# Patient Record
Sex: FEMALE | Race: OTHER | Employment: OTHER | URBAN - METROPOLITAN AREA
[De-identification: names, ages, dates, MRNs, and addresses within clinical notes are randomized per-mention and may not be internally consistent; named-entity substitution may affect disease eponyms.]

---

## 2018-07-25 ENCOUNTER — APPOINTMENT (OUTPATIENT)
Dept: CT IMAGING | Facility: HOSPITAL | Age: 73
DRG: 246 | End: 2018-07-25
Attending: EMERGENCY MEDICINE
Payer: MEDICARE

## 2018-07-25 ENCOUNTER — HOSPITAL ENCOUNTER (INPATIENT)
Facility: HOSPITAL | Age: 73
LOS: 5 days | Discharge: HOME | DRG: 246 | End: 2018-07-31
Attending: EMERGENCY MEDICINE | Admitting: HOSPITALIST
Payer: MEDICARE

## 2018-07-25 ENCOUNTER — APPOINTMENT (OUTPATIENT)
Dept: GENERAL RADIOLOGY | Facility: HOSPITAL | Age: 73
DRG: 246 | End: 2018-07-25
Attending: EMERGENCY MEDICINE
Payer: MEDICARE

## 2018-07-25 DIAGNOSIS — J81.0 ACUTE PULMONARY EDEMA (HCC): Primary | ICD-10-CM

## 2018-07-25 DIAGNOSIS — I16.1 HYPERTENSIVE EMERGENCY: ICD-10-CM

## 2018-07-25 DIAGNOSIS — J96.01 ACUTE RESPIRATORY FAILURE WITH HYPOXIA (HCC): ICD-10-CM

## 2018-07-25 LAB
ALBUMIN SERPL BCP-MCNC: 4 G/DL (ref 3.5–4.8)
ALBUMIN/GLOB SERPL: 1.1 {RATIO} (ref 1–2)
ALP SERPL-CCNC: 48 U/L (ref 32–100)
ALT SERPL-CCNC: 14 U/L (ref 14–54)
ANION GAP SERPL CALC-SCNC: 13 MMOL/L (ref 0–18)
APTT PPP: 28.1 SECONDS (ref 23.2–35.3)
AST SERPL-CCNC: 30 U/L (ref 15–41)
BASE EXCESS BLD CALC-SCNC: -5.4 MMOL/L (ref ?–2)
BASOPHILS # BLD: 0.1 K/UL (ref 0–0.2)
BASOPHILS NFR BLD: 1 %
BILIRUB SERPL-MCNC: 0.6 MG/DL (ref 0.3–1.2)
BLOOD GAS EPAP: 6 CM H2O
BLOOD GAS IPAP: 12 CM H2O
BNP SERPL-MCNC: 1571 PG/ML (ref 0–100)
BUN SERPL-MCNC: 12 MG/DL (ref 8–20)
BUN/CREAT SERPL: 12.4 (ref 10–20)
CALCIUM SERPL-MCNC: 8.6 MG/DL (ref 8.5–10.5)
CHLORIDE SERPL-SCNC: 105 MMOL/L (ref 95–110)
CHOLEST SERPL-MCNC: 247 MG/DL (ref 110–200)
CO2 SERPL-SCNC: 20 MMOL/L (ref 22–32)
CREAT SERPL-MCNC: 0.97 MG/DL (ref 0.5–1.5)
EOSINOPHIL # BLD: 0.3 K/UL (ref 0–0.7)
EOSINOPHIL NFR BLD: 3 %
ERYTHROCYTE [DISTWIDTH] IN BLOOD BY AUTOMATED COUNT: 13.4 % (ref 11–15)
GLOBULIN PLAS-MCNC: 3.7 G/DL (ref 2.5–3.7)
GLUCOSE SERPL-MCNC: 345 MG/DL (ref 70–99)
HCO3 BLDA-SCNC: 20 MEQ/L (ref 21–27)
HCT VFR BLD AUTO: 43.8 % (ref 35–48)
HDLC SERPL-MCNC: 68 MG/DL
HGB BLD-MCNC: 14.1 G/DL (ref 12–16)
INR BLD: 1.1 (ref 0.9–1.2)
LDLC SERPL CALC-MCNC: 149 MG/DL (ref 0–99)
LYMPHOCYTES # BLD: 4.9 K/UL (ref 1–4)
LYMPHOCYTES NFR BLD: 42 %
MAGNESIUM SERPL-MCNC: 2.2 MG/DL (ref 1.8–2.5)
MCH RBC QN AUTO: 30.7 PG (ref 27–32)
MCHC RBC AUTO-ENTMCNC: 32.2 G/DL (ref 32–37)
MCV RBC AUTO: 95.3 FL (ref 80–100)
MODIFIED ALLEN TEST: POSITIVE
MONOCYTES # BLD: 0.4 K/UL (ref 0–1)
MONOCYTES NFR BLD: 3 %
NEUTROPHILS # BLD AUTO: 6 K/UL (ref 1.8–7.7)
NEUTROPHILS NFR BLD: 51 %
NONHDLC SERPL-MCNC: 179 MG/DL
O2 CT BLD-SCNC: 17.2 VOL% (ref 15–23)
O2/TOTAL GAS SETTING VFR VENT: 100 %
OSMOLALITY UR CALC.SUM OF ELEC: 299 MOSM/KG (ref 275–295)
PCO2 BLDA: 41 MM HG (ref 35–45)
PH BLDA: 7.31 [PH] (ref 7.35–7.45)
PLATELET # BLD AUTO: 288 K/UL (ref 140–400)
PMV BLD AUTO: 8.7 FL (ref 7.4–10.3)
PO2 BLDA: 76 MM HG (ref 80–100)
PO2 SATN ADJ TO 0.5 BLD: 26 MMHG (ref 24–28)
POTASSIUM SERPL-SCNC: 3.5 MMOL/L (ref 3.3–5.1)
PROT SERPL-MCNC: 7.7 G/DL (ref 5.9–8.4)
PROTHROMBIN TIME: 14.2 SECONDS (ref 11.8–14.5)
PUNCTURE CHARGE: YES
RBC # BLD AUTO: 4.59 M/UL (ref 3.7–5.4)
RESP RATE: 15 BPM
SAO2 % BLDA: 95.2 % (ref 94–100)
SODIUM SERPL-SCNC: 138 MMOL/L (ref 136–144)
TRIGL SERPL-MCNC: 152 MG/DL (ref 1–149)
TROPONIN I SERPL-MCNC: 0.04 NG/ML (ref ?–0.03)
WBC # BLD AUTO: 11.7 K/UL (ref 4–11)

## 2018-07-25 PROCEDURE — 71045 X-RAY EXAM CHEST 1 VIEW: CPT | Performed by: EMERGENCY MEDICINE

## 2018-07-25 RX ORDER — FUROSEMIDE 10 MG/ML
40 INJECTION INTRAMUSCULAR; INTRAVENOUS ONCE
Status: COMPLETED | OUTPATIENT
Start: 2018-07-25 | End: 2018-07-25

## 2018-07-25 RX ORDER — NITROGLYCERIN 20 MG/100ML
50 INJECTION INTRAVENOUS CONTINUOUS
Status: DISCONTINUED | OUTPATIENT
Start: 2018-07-25 | End: 2018-07-28

## 2018-07-25 RX ORDER — NITROGLYCERIN 0.4 MG/1
0.4 TABLET SUBLINGUAL ONCE
Status: COMPLETED | OUTPATIENT
Start: 2018-07-25 | End: 2018-07-25

## 2018-07-26 ENCOUNTER — APPOINTMENT (OUTPATIENT)
Dept: CV DIAGNOSTICS | Facility: HOSPITAL | Age: 73
DRG: 246 | End: 2018-07-26
Attending: INTERNAL MEDICINE
Payer: MEDICARE

## 2018-07-26 ENCOUNTER — APPOINTMENT (OUTPATIENT)
Dept: GENERAL RADIOLOGY | Facility: HOSPITAL | Age: 73
DRG: 246 | End: 2018-07-26
Attending: INTERNAL MEDICINE
Payer: MEDICARE

## 2018-07-26 PROBLEM — J96.01 ACUTE RESPIRATORY FAILURE WITH HYPOXIA (HCC): Status: ACTIVE | Noted: 2018-07-26

## 2018-07-26 PROBLEM — I16.1 HYPERTENSIVE EMERGENCY: Status: ACTIVE | Noted: 2018-07-26

## 2018-07-26 PROBLEM — I50.9 CHF (CONGESTIVE HEART FAILURE) (HCC): Status: ACTIVE | Noted: 2018-07-26

## 2018-07-26 LAB
ANION GAP SERPL CALC-SCNC: 8 MMOL/L (ref 0–18)
APTT PPP: 27.7 SECONDS (ref 23.2–35.3)
APTT PPP: 57 SECONDS (ref 23.2–35.3)
BASOPHILS # BLD: 0 K/UL (ref 0–0.2)
BASOPHILS NFR BLD: 0 %
BUN SERPL-MCNC: 10 MG/DL (ref 8–20)
BUN/CREAT SERPL: 11.9 (ref 10–20)
CALCIUM SERPL-MCNC: 8.5 MG/DL (ref 8.5–10.5)
CHLORIDE SERPL-SCNC: 105 MMOL/L (ref 95–110)
CO2 SERPL-SCNC: 26 MMOL/L (ref 22–32)
CREAT SERPL-MCNC: 0.84 MG/DL (ref 0.5–1.5)
EOSINOPHIL # BLD: 0 K/UL (ref 0–0.7)
EOSINOPHIL NFR BLD: 0 %
ERYTHROCYTE [DISTWIDTH] IN BLOOD BY AUTOMATED COUNT: 13.2 % (ref 11–15)
GLUCOSE SERPL-MCNC: 141 MG/DL (ref 70–99)
HCT VFR BLD AUTO: 37.7 % (ref 35–48)
HGB BLD-MCNC: 12.5 G/DL (ref 12–16)
LYMPHOCYTES # BLD: 1.1 K/UL (ref 1–4)
LYMPHOCYTES NFR BLD: 11 %
MCH RBC QN AUTO: 30.9 PG (ref 27–32)
MCHC RBC AUTO-ENTMCNC: 33.2 G/DL (ref 32–37)
MCV RBC AUTO: 93.2 FL (ref 80–100)
MONOCYTES # BLD: 0.8 K/UL (ref 0–1)
MONOCYTES NFR BLD: 7 %
MRSA DNA SPEC QL NAA+PROBE: NEGATIVE
NEUTROPHILS # BLD AUTO: 8.5 K/UL (ref 1.8–7.7)
NEUTROPHILS NFR BLD: 82 %
OSMOLALITY UR CALC.SUM OF ELEC: 289 MOSM/KG (ref 275–295)
PLATELET # BLD AUTO: 240 K/UL (ref 140–400)
PMV BLD AUTO: 8.2 FL (ref 7.4–10.3)
POTASSIUM SERPL-SCNC: 4.2 MMOL/L (ref 3.3–5.1)
RBC # BLD AUTO: 4.05 M/UL (ref 3.7–5.4)
SODIUM SERPL-SCNC: 139 MMOL/L (ref 136–144)
TROPONIN I SERPL-MCNC: 0.68 NG/ML (ref ?–0.03)
TROPONIN I SERPL-MCNC: 0.89 NG/ML (ref ?–0.03)
WBC # BLD AUTO: 10.5 K/UL (ref 4–11)

## 2018-07-26 PROCEDURE — 71045 X-RAY EXAM CHEST 1 VIEW: CPT | Performed by: INTERNAL MEDICINE

## 2018-07-26 PROCEDURE — 93306 TTE W/DOPPLER COMPLETE: CPT | Performed by: INTERNAL MEDICINE

## 2018-07-26 PROCEDURE — 99223 1ST HOSP IP/OBS HIGH 75: CPT | Performed by: INTERNAL MEDICINE

## 2018-07-26 RX ORDER — ASPIRIN 81 MG/1
324 TABLET, CHEWABLE ORAL ONCE
Status: COMPLETED | OUTPATIENT
Start: 2018-07-26 | End: 2018-07-27

## 2018-07-26 RX ORDER — 0.9 % SODIUM CHLORIDE 0.9 %
VIAL (ML) INJECTION
Status: COMPLETED
Start: 2018-07-26 | End: 2018-07-26

## 2018-07-26 RX ORDER — HEPARIN SODIUM 1000 [USP'U]/ML
60 INJECTION, SOLUTION INTRAVENOUS; SUBCUTANEOUS ONCE
Status: COMPLETED | OUTPATIENT
Start: 2018-07-26 | End: 2018-07-26

## 2018-07-26 RX ORDER — HEPARIN SODIUM 5000 [USP'U]/ML
5000 INJECTION, SOLUTION INTRAVENOUS; SUBCUTANEOUS EVERY 12 HOURS
Status: DISCONTINUED | OUTPATIENT
Start: 2018-07-26 | End: 2018-07-26

## 2018-07-26 RX ORDER — SODIUM CHLORIDE 9 MG/ML
INJECTION, SOLUTION INTRAVENOUS CONTINUOUS
Status: DISCONTINUED | OUTPATIENT
Start: 2018-07-26 | End: 2018-07-28

## 2018-07-26 RX ORDER — HEPARIN SODIUM AND DEXTROSE 10000; 5 [USP'U]/100ML; G/100ML
INJECTION INTRAVENOUS CONTINUOUS
Status: DISCONTINUED | OUTPATIENT
Start: 2018-07-26 | End: 2018-07-27

## 2018-07-26 RX ORDER — HEPARIN SODIUM AND DEXTROSE 10000; 5 [USP'U]/100ML; G/100ML
12 INJECTION INTRAVENOUS ONCE
Status: COMPLETED | OUTPATIENT
Start: 2018-07-26 | End: 2018-07-26

## 2018-07-26 RX ORDER — CHLORHEXIDINE GLUCONATE 4 G/100ML
30 SOLUTION TOPICAL
Status: COMPLETED | OUTPATIENT
Start: 2018-07-27 | End: 2018-07-27

## 2018-07-26 RX ORDER — FUROSEMIDE 10 MG/ML
40 INJECTION INTRAMUSCULAR; INTRAVENOUS
Status: DISCONTINUED | OUTPATIENT
Start: 2018-07-26 | End: 2018-07-30

## 2018-07-26 RX ORDER — ASPIRIN 325 MG
325 TABLET ORAL DAILY
Status: DISCONTINUED | OUTPATIENT
Start: 2018-07-26 | End: 2018-07-27

## 2018-07-26 RX ORDER — ASPIRIN 81 MG/1
81 TABLET ORAL DAILY
Status: DISCONTINUED | OUTPATIENT
Start: 2018-07-26 | End: 2018-07-26

## 2018-07-26 NOTE — CONSULTS
[facility]    Report of Consultation    Floridalma Buckner Patient Status:  Inpatient    1945 MRN [id number]   Location [facility] 2W/ Attending Neli Rouse MD   Hosp Day # 0 PCP None Pcp     Date of Admission:  2018 BiPAP.  She never had associated chest pain. EKG revealed sinus with left bundle branch block. Troponin was initially borderline at 0.04 and the BNP was 1571. Initial blood pressure was 180/110.   Echo which I reviewed reveals moderate MR LV enlargement Normocephalic, anicteric sclera, neck supple, no thyromegaly or adenopathy. Neck:  No JVD, carotids 2+, no bruits. Cardiac:  Regular rate and rhythm. S1, S2 normal. No murmur,  rub, or extra cardiac sounds.   Lungs:  Clear without wheezes, rales, rhonchi superimposed pneumonia. 3. No pneumothorax      Dictated by (CST): Lanie Radford MD on 7/25/2018 at 22:12     Approved by (CST): Lanie Radford MD on 7/25/2018 at 22:17            Thank you for allowing me to participate in the care of your patient.

## 2018-07-26 NOTE — CONSULTS
[facility]OSP - [facility]    Consult Note    Date:  7/26/2018  Date of Admission:  7/25/2018      Chief Complaint:   Jw Dowling is a(n) 68year old female with dyspnea. HPI:   Patient is a history of untreated hypertension.   She does not follow with %.     Alert white female on BiPAP  HEENT examination is unremarkable with pupils equal round and reactive to light and accommodation. Neck without adenopathy, thyromegaly, JVD nor bruit. Lungs diminished with basilar crackles to auscultation.   Cardiac [facility]  Medical Director, [address] [facility]. [phone]  Pager: [phone]

## 2018-07-26 NOTE — PLAN OF CARE
Problem: Patient Centered Care  Goal: Patient preferences are identified and integrated in the patient's plan of care  Interventions:  - What would you like us to know as we care for you? I am nervous about my health. I feel better when my family is here. injury  INTERVENTIONS:  - Assess pt frequently for physical needs  - Identify cognitive and physical deficits and behaviors that affect risk of falls.   - [place] fall precautions as indicated by assessment.  - Educate pt/family on patient safety includin strategies  - Use visual cues when possible  - Listen attentively, be patient, do not interrupt  - Minimize distractions  - Allow time for understanding and response  - Establish method for patient to ask for assistance (call light)  - Provide an interpret respiratory status  - Assess for changes in mentation and behavior  - Position to facilitate oxygenation and minimize respiratory effort  - Oxygen supplementation based on oxygen saturation or ABGs  - Provide Smoking Cessation handout, if applicable  - Enc independent at home. To encourage patient to remain as much independence as her health status allows, set up bedpan at bedside to allow patient independence in this activity after assisting with lines/equipment.  Allowed patient to stand up and pivot to [employer]

## 2018-07-26 NOTE — ED PROVIDER NOTES
Patient Seen in: [facility] Emergency Department    History   Patient presents with:  Shortness Of Breath    Stated Complaint:     HPI    50-year-old female presents for complaint of shortness of breath.   Patient is unable to provide any history s Neck: Normal range of motion. Neck supple. Cardiovascular: Regular rhythm, S1 normal, S2 normal, normal heart sounds and intact distal pulses. Tachycardia present. Pulses:       Radial pulses are 2+ on the right side, and 2+ on the left side. Normal   PTT, ACTIVATED - Normal   CBC WITH DIFFERENTIAL WITH PLATELET    Narrative: The following orders were created for panel order CBC WITH DIFFERENTIAL WITH PLATELET.   Procedure                               Abnormality         Status Atherosclerotic aorta with unusual density in the projection of the aortic knob LUNGS/PLEURA: Poor inspiratory effort. Bilateral effusions with adjacent lower lung field compressive atelectasis.  Areas of superimposed atelectasis or partial consolidation ma screening including reassessment of your blood pressure. Medications Prescribed:  There are no discharge medications for this patient.       Present on Admission           ICD-10-CM Noted POA    Acute pulmonary edema ([facility] [address] J81.0 7/25/2018 Unknown

## 2018-07-27 ENCOUNTER — APPOINTMENT (OUTPATIENT)
Dept: INTERVENTIONAL RADIOLOGY/VASCULAR | Facility: HOSPITAL | Age: 73
DRG: 246 | End: 2018-07-27
Attending: INTERNAL MEDICINE
Payer: MEDICARE

## 2018-07-27 ENCOUNTER — APPOINTMENT (OUTPATIENT)
Dept: GENERAL RADIOLOGY | Facility: HOSPITAL | Age: 73
DRG: 246 | End: 2018-07-27
Attending: INTERNAL MEDICINE
Payer: MEDICARE

## 2018-07-27 ENCOUNTER — PRIOR ORIGINAL RECORDS (OUTPATIENT)
Dept: OTHER | Age: 73
End: 2018-07-27

## 2018-07-27 LAB
ANION GAP SERPL CALC-SCNC: 7 MMOL/L (ref 0–18)
APTT PPP: 55.5 SECONDS (ref 23.2–35.3)
BASOPHILS # BLD: 0.1 K/UL (ref 0–0.2)
BASOPHILS NFR BLD: 1 %
BUN SERPL-MCNC: 13 MG/DL (ref 8–20)
BUN/CREAT SERPL: 14.6 (ref 10–20)
CALCIUM SERPL-MCNC: 8.3 MG/DL (ref 8.5–10.5)
CHLORIDE SERPL-SCNC: 103 MMOL/L (ref 95–110)
CHOLEST SERPL-MCNC: 187 MG/DL (ref 110–200)
CO2 SERPL-SCNC: 27 MMOL/L (ref 22–32)
CREAT SERPL-MCNC: 0.89 MG/DL (ref 0.5–1.5)
EOSINOPHIL # BLD: 0.2 K/UL (ref 0–0.7)
EOSINOPHIL NFR BLD: 3 %
ERYTHROCYTE [DISTWIDTH] IN BLOOD BY AUTOMATED COUNT: 13.3 % (ref 11–15)
GLUCOSE SERPL-MCNC: 147 MG/DL (ref 70–99)
HCT VFR BLD AUTO: 32.5 % (ref 35–48)
HDLC SERPL-MCNC: 57 MG/DL
HGB BLD-MCNC: 11.1 G/DL (ref 12–16)
LDLC SERPL CALC-MCNC: 111 MG/DL (ref 0–99)
LYMPHOCYTES # BLD: 1.9 K/UL (ref 1–4)
LYMPHOCYTES NFR BLD: 24 %
MAGNESIUM SERPL-MCNC: 1.8 MG/DL (ref 1.8–2.5)
MCH RBC QN AUTO: 31.2 PG (ref 27–32)
MCHC RBC AUTO-ENTMCNC: 34.3 G/DL (ref 32–37)
MCV RBC AUTO: 91.2 FL (ref 80–100)
MONOCYTES # BLD: 0.8 K/UL (ref 0–1)
MONOCYTES NFR BLD: 11 %
NEUTROPHILS # BLD AUTO: 4.8 K/UL (ref 1.8–7.7)
NEUTROPHILS NFR BLD: 62 %
NONHDLC SERPL-MCNC: 130 MG/DL
OSMOLALITY UR CALC.SUM OF ELEC: 287 MOSM/KG (ref 275–295)
PLATELET # BLD AUTO: 239 K/UL (ref 140–400)
PMV BLD AUTO: 8.2 FL (ref 7.4–10.3)
POTASSIUM SERPL-SCNC: 3.1 MMOL/L (ref 3.3–5.1)
RBC # BLD AUTO: 3.57 M/UL (ref 3.7–5.4)
SODIUM SERPL-SCNC: 137 MMOL/L (ref 136–144)
TRIGL SERPL-MCNC: 93 MG/DL (ref 1–149)
WBC # BLD AUTO: 7.8 K/UL (ref 4–11)

## 2018-07-27 PROCEDURE — B2151ZZ FLUOROSCOPY OF LEFT HEART USING LOW OSMOLAR CONTRAST: ICD-10-PCS | Performed by: INTERNAL MEDICINE

## 2018-07-27 PROCEDURE — 71045 X-RAY EXAM CHEST 1 VIEW: CPT | Performed by: INTERNAL MEDICINE

## 2018-07-27 PROCEDURE — 99233 SBSQ HOSP IP/OBS HIGH 50: CPT | Performed by: INTERNAL MEDICINE

## 2018-07-27 PROCEDURE — 027034Z DILATION OF CORONARY ARTERY, ONE ARTERY WITH DRUG-ELUTING INTRALUMINAL DEVICE, PERCUTANEOUS APPROACH: ICD-10-PCS | Performed by: INTERNAL MEDICINE

## 2018-07-27 PROCEDURE — 4A023N8 MEASUREMENT OF CARDIAC SAMPLING AND PRESSURE, BILATERAL, PERCUTANEOUS APPROACH: ICD-10-PCS | Performed by: INTERNAL MEDICINE

## 2018-07-27 PROCEDURE — B2111ZZ FLUOROSCOPY OF MULTIPLE CORONARY ARTERIES USING LOW OSMOLAR CONTRAST: ICD-10-PCS | Performed by: INTERNAL MEDICINE

## 2018-07-27 RX ORDER — CLOPIDOGREL BISULFATE 75 MG/1
75 TABLET ORAL DAILY
Status: DISCONTINUED | OUTPATIENT
Start: 2018-07-28 | End: 2018-07-31

## 2018-07-27 RX ORDER — CLOPIDOGREL BISULFATE 75 MG/1
TABLET ORAL
Status: COMPLETED
Start: 2018-07-27 | End: 2018-07-27

## 2018-07-27 RX ORDER — HEPARIN SODIUM 1000 [USP'U]/ML
INJECTION, SOLUTION INTRAVENOUS; SUBCUTANEOUS
Status: COMPLETED
Start: 2018-07-27 | End: 2018-07-27

## 2018-07-27 RX ORDER — POTASSIUM CHLORIDE 20 MEQ/1
40 TABLET, EXTENDED RELEASE ORAL DAILY
Status: DISCONTINUED | OUTPATIENT
Start: 2018-07-27 | End: 2018-07-31

## 2018-07-27 RX ORDER — LISINOPRIL 5 MG/1
5 TABLET ORAL DAILY
Status: DISCONTINUED | OUTPATIENT
Start: 2018-07-27 | End: 2018-07-31

## 2018-07-27 RX ORDER — MIDAZOLAM HYDROCHLORIDE 1 MG/ML
INJECTION INTRAMUSCULAR; INTRAVENOUS
Status: COMPLETED
Start: 2018-07-27 | End: 2018-07-27

## 2018-07-27 RX ORDER — LIDOCAINE HYDROCHLORIDE 20 MG/ML
INJECTION, SOLUTION EPIDURAL; INFILTRATION; INTRACAUDAL; PERINEURAL
Status: COMPLETED
Start: 2018-07-27 | End: 2018-07-27

## 2018-07-27 RX ORDER — ATORVASTATIN CALCIUM 20 MG/1
20 TABLET, FILM COATED ORAL NIGHTLY
Status: DISCONTINUED | OUTPATIENT
Start: 2018-07-27 | End: 2018-07-31

## 2018-07-27 RX ORDER — CARVEDILOL 3.12 MG/1
3.12 TABLET ORAL 2 TIMES DAILY WITH MEALS
Status: DISCONTINUED | OUTPATIENT
Start: 2018-07-27 | End: 2018-07-31

## 2018-07-27 RX ORDER — ASPIRIN 81 MG/1
81 TABLET ORAL DAILY
Status: DISCONTINUED | OUTPATIENT
Start: 2018-07-28 | End: 2018-07-31

## 2018-07-27 RX ORDER — NITROGLYCERIN 20 MG/100ML
INJECTION INTRAVENOUS
Status: COMPLETED
Start: 2018-07-27 | End: 2018-07-27

## 2018-07-27 NOTE — PROGRESS NOTES
Pulmonary/Critical Care Follow Up Note    HPI:   Emir Gaspar is a 68year old female with Patient presents with:  Shortness Of Breath      PCP None Pcp  Admission Attending Karma Owusu 15 Day #1    No CP  Less sob  No fever  Has appetite emergecny  Better  Plan ntg gtt   NIV as needed   Lasix    NSTEMI  S/p PCI with intervention  No CP  Plan ASA/Plavix   Cardiac rehab?     DVT px  Plan IV heparin          Caryle Parsons, MD

## 2018-07-27 NOTE — PLAN OF CARE
Achieves optimal ventilation and oxygenation Not Progressing    Carmel has alternated between Bipap and high flow nc 15L today. She does desat to the 88-90% range after being on the nc for a while and then is switched back to bipap.  She continues to have c

## 2018-07-27 NOTE — H&P
Cardiology. Will start weaning ntg and add lisinopril coreg and atorvastatin. Reassess lasix  In am and consider aldactone.   Ana Self MD [facility]

## 2018-07-27 NOTE — PROGRESS NOTES
Core Measure Update: EF 35-40. Currentlynot on ACE, ARB, Spironolactone , long acting BB secondary to hypertensive urgency on Nitro. Consider in the future if clinically appropriate.

## 2018-07-27 NOTE — DIETARY NOTE
NUTRITION:  Diet Education    Consult received for diet education per protocol. Education deferred until s/p intervention and when appropriate for teaching.     [address], [address] [facility]  Ext [phone]

## 2018-07-28 LAB
ANION GAP SERPL CALC-SCNC: 7 MMOL/L (ref 0–18)
BUN SERPL-MCNC: 14 MG/DL (ref 8–20)
BUN/CREAT SERPL: 17.3 (ref 10–20)
CALCIUM SERPL-MCNC: 8.4 MG/DL (ref 8.5–10.5)
CHLORIDE SERPL-SCNC: 102 MMOL/L (ref 95–110)
CO2 SERPL-SCNC: 28 MMOL/L (ref 22–32)
CREAT SERPL-MCNC: 0.81 MG/DL (ref 0.5–1.5)
ERYTHROCYTE [DISTWIDTH] IN BLOOD BY AUTOMATED COUNT: 13.2 % (ref 11–15)
GLUCOSE SERPL-MCNC: 123 MG/DL (ref 70–99)
HCT VFR BLD AUTO: 34.2 % (ref 35–48)
HGB BLD-MCNC: 11.5 G/DL (ref 12–16)
MCH RBC QN AUTO: 30.8 PG (ref 27–32)
MCHC RBC AUTO-ENTMCNC: 33.6 G/DL (ref 32–37)
MCV RBC AUTO: 91.6 FL (ref 80–100)
OSMOLALITY UR CALC.SUM OF ELEC: 286 MOSM/KG (ref 275–295)
PLATELET # BLD AUTO: 224 K/UL (ref 140–400)
PMV BLD AUTO: 8.7 FL (ref 7.4–10.3)
POTASSIUM SERPL-SCNC: 3.6 MMOL/L (ref 3.3–5.1)
RBC # BLD AUTO: 3.73 M/UL (ref 3.7–5.4)
SODIUM SERPL-SCNC: 137 MMOL/L (ref 136–144)
WBC # BLD AUTO: 7.9 K/UL (ref 4–11)

## 2018-07-28 PROCEDURE — 99233 SBSQ HOSP IP/OBS HIGH 50: CPT | Performed by: HOSPITALIST

## 2018-07-28 PROCEDURE — 99232 SBSQ HOSP IP/OBS MODERATE 35: CPT | Performed by: INTERNAL MEDICINE

## 2018-07-28 RX ORDER — POTASSIUM CHLORIDE 20 MEQ/1
40 TABLET, EXTENDED RELEASE ORAL EVERY 4 HOURS
Status: COMPLETED | OUTPATIENT
Start: 2018-07-28 | End: 2018-07-28

## 2018-07-28 NOTE — PROGRESS NOTES
[facility]    Progress Note    Ciara Barnes Patient Status:  Inpatient    1945 MRN [id number]   Location [facility] 3W/SW Attending Dwaine Washington MD   Hosp Day # 2 PCP None Pcp     Subjective:     Constitutional: Waqar Daniel care    Needs new primary will refer to [address]  speaking primary at  as she came in on dr boyer's er call          Results:     Lab Results  Component Value Date   WBC 7.9 07/28/2018   HGB 11.5 (L) 07/28/2018   HCT 34.2 (L) 07/28/2018    07/28/

## 2018-07-28 NOTE — PROGRESS NOTES
[facility]OSP - [facility]    Cardiology Progress Note    Donnis Ground Patient Status:  Inpatient    1945 MRN [id number]   Location [facility] 2W/SW Attending Catrina Wiggins MD   Hosp Day # 2 PCP None Pcp     2018  Subjective: no ev Lab  07/25/18 2122   ALT  14   AST  30   ALB  4.0       Recent Labs   Lab  07/25/18 2122 07/26/18   0922  07/26/18   1627   TROP  0.04*  0.89*  0.68*       Allergies:   No Known Allergies    Medications:    Current Facility-Administered Medications:

## 2018-07-28 NOTE — PROGRESS NOTES
[facility]     Progress Note        Marie Gooden Patient Status:  Inpatient    1945 MRN [id number]   Location [facility] 3W/SW Attending Kisha Singer MD   Hosp Day # 2 PCP None Pcp       Subjective:   Patient seen and e Ap Portable  (cpt=71045)    Result Date: 7/27/2018  CONCLUSION:  1. Persistent but decreased bilateral airspace disease right more than left which may be related to underlying pulmonary edema which has improved slightly since previous study.  Can't exclude

## 2018-07-29 ENCOUNTER — APPOINTMENT (OUTPATIENT)
Dept: GENERAL RADIOLOGY | Facility: HOSPITAL | Age: 73
DRG: 246 | End: 2018-07-29
Attending: INTERNAL MEDICINE
Payer: MEDICARE

## 2018-07-29 LAB — POTASSIUM SERPL-SCNC: 4.1 MMOL/L (ref 3.3–5.1)

## 2018-07-29 PROCEDURE — 71045 X-RAY EXAM CHEST 1 VIEW: CPT | Performed by: INTERNAL MEDICINE

## 2018-07-29 PROCEDURE — 99233 SBSQ HOSP IP/OBS HIGH 50: CPT | Performed by: HOSPITALIST

## 2018-07-29 PROCEDURE — 99232 SBSQ HOSP IP/OBS MODERATE 35: CPT | Performed by: INTERNAL MEDICINE

## 2018-07-29 RX ORDER — FUROSEMIDE 10 MG/ML
40 INJECTION INTRAMUSCULAR; INTRAVENOUS ONCE
Status: COMPLETED | OUTPATIENT
Start: 2018-07-29 | End: 2018-07-29

## 2018-07-29 RX ORDER — 0.9 % SODIUM CHLORIDE 0.9 %
VIAL (ML) INJECTION
Status: COMPLETED
Start: 2018-07-29 | End: 2018-07-29

## 2018-07-29 NOTE — PLAN OF CARE
Problem: Patient/Family Goals  Goal: Patient/Family Long Term Goal  Patient's Long Term Goal: To get better and go home    Interventions:  - Participates in care planning, education, and incorporates family into education.  - See additional Care Plan goals Assess for changes in mentation and behavior  - Position to facilitate oxygenation and minimize respiratory effort  - Oxygen supplementation based on oxygen saturation or ABGs  - Provide Smoking Cessation handout, if applicable  - Encourage broncho-pulmona

## 2018-07-29 NOTE — PROGRESS NOTES
[facility]     Progress Note        Salome Garcia Patient Status:  Inpatient    1945 MRN [id number]   Location [facility] 3W/SW Attending Robbi Balderrama MD   Hosp Day # 3 PCP None Pcp       Subjective:   Patient seen and e 7/27/2018  CONCLUSION:  1. Persistent but decreased bilateral airspace disease right more than left which may be related to underlying pulmonary edema which has improved slightly since previous study. Can't exclude underlying pneumonia.  Small right pleural

## 2018-07-29 NOTE — PROGRESS NOTES
[facility] - [facility]    Progress Note    Iqbal  Patient Status:  Inpatient    1945 MRN [id number]   Location [facility] 3W/SW Attending Keenan Powell Day # 3 PCP None Pcp     Subjective:     HENT: Positive f family translating  On [address] about MI care     Needs new primary will refer to [address] speaking primary at  as she came in on dr boyer's er call          Results:     Lab Results  Component Value Date   WBC 7.9 07/28/2018   HGB 11.5 (L) 07/28

## 2018-07-30 ENCOUNTER — APPOINTMENT (OUTPATIENT)
Dept: CV DIAGNOSTICS | Facility: HOSPITAL | Age: 73
DRG: 246 | End: 2018-07-30
Attending: INTERNAL MEDICINE
Payer: MEDICARE

## 2018-07-30 ENCOUNTER — PRIOR ORIGINAL RECORDS (OUTPATIENT)
Dept: OTHER | Age: 73
End: 2018-07-30

## 2018-07-30 LAB
ANION GAP SERPL CALC-SCNC: 7 MMOL/L (ref 0–18)
BASOPHILS # BLD: 0 K/UL (ref 0–0.2)
BASOPHILS NFR BLD: 1 %
BUN SERPL-MCNC: 31 MG/DL (ref 8–20)
BUN/CREAT SERPL: 35.2 (ref 10–20)
CALCIUM SERPL-MCNC: 9.3 MG/DL (ref 8.5–10.5)
CHLORIDE SERPL-SCNC: 103 MMOL/L (ref 95–110)
CO2 SERPL-SCNC: 29 MMOL/L (ref 22–32)
CREAT SERPL-MCNC: 0.88 MG/DL (ref 0.5–1.5)
EOSINOPHIL # BLD: 0.3 K/UL (ref 0–0.7)
EOSINOPHIL NFR BLD: 4 %
ERYTHROCYTE [DISTWIDTH] IN BLOOD BY AUTOMATED COUNT: 13.2 % (ref 11–15)
GLUCOSE SERPL-MCNC: 129 MG/DL (ref 70–99)
HBA1C MFR BLD: 5.6 % (ref 4–6)
HCT VFR BLD AUTO: 38.4 % (ref 35–48)
HGB BLD-MCNC: 12.8 G/DL (ref 12–16)
LYMPHOCYTES # BLD: 1.8 K/UL (ref 1–4)
LYMPHOCYTES NFR BLD: 27 %
MAGNESIUM SERPL-MCNC: 2 MG/DL (ref 1.8–2.5)
MCH RBC QN AUTO: 30.8 PG (ref 27–32)
MCHC RBC AUTO-ENTMCNC: 33.3 G/DL (ref 32–37)
MCV RBC AUTO: 92.5 FL (ref 80–100)
MONOCYTES # BLD: 0.6 K/UL (ref 0–1)
MONOCYTES NFR BLD: 10 %
NEUTROPHILS # BLD AUTO: 3.9 K/UL (ref 1.8–7.7)
NEUTROPHILS NFR BLD: 59 %
OSMOLALITY UR CALC.SUM OF ELEC: 296 MOSM/KG (ref 275–295)
PLATELET # BLD AUTO: 271 K/UL (ref 140–400)
PMV BLD AUTO: 8.9 FL (ref 7.4–10.3)
POTASSIUM SERPL-SCNC: 4.3 MMOL/L (ref 3.3–5.1)
RBC # BLD AUTO: 4.14 M/UL (ref 3.7–5.4)
SODIUM SERPL-SCNC: 139 MMOL/L (ref 136–144)
WBC # BLD AUTO: 6.6 K/UL (ref 4–11)

## 2018-07-30 PROCEDURE — 99233 SBSQ HOSP IP/OBS HIGH 50: CPT | Performed by: HOSPITALIST

## 2018-07-30 PROCEDURE — 93306 TTE W/DOPPLER COMPLETE: CPT | Performed by: INTERNAL MEDICINE

## 2018-07-30 RX ORDER — FUROSEMIDE 20 MG/1
20 TABLET ORAL DAILY
Status: DISCONTINUED | OUTPATIENT
Start: 2018-07-31 | End: 2018-07-31

## 2018-07-30 NOTE — PROGRESS NOTES
[facility]    Progress Note    Eduardo Max Patient Status:  Inpatient    1945 MRN [id number]   Location [facility] 3W/SW Attending Keenan Powell Day # 4 PCP None Pcp     Subjective:     Constitutional: translating  On [address] about MI care     Needs new primary will refer to babak lockwood at  as she came in on dr boyer's er call            Results:     Lab Results  Component Value Date   WBC 6.6 07/30/2018   HGB 12.8 07/30/2018   HCT 38.4

## 2018-07-30 NOTE — PROGRESS NOTES
[facility]    Progress Note    Jose Maria Morales Patient Status:  Inpatient    1945 MRN [id number]   Location [facility] 3W/SW Attending Keenan Powell Day # 4 PCP None Pcp     Subjective:     Respiratory: Neg 7.7 07/25/2018   AST 30 07/25/2018   ALT 14 07/25/2018   PTT 55.5 (H) 07/27/2018   INR 1.1 07/25/2018   MG 2.0 07/30/2018   TROP 0.68 (HH) 07/26/2018       Xr Chest Ap Portable  (cpt=71045)    Result Date: 7/29/2018  CONCLUSION:   Mild interstitial edema,

## 2018-07-30 NOTE — CARDIAC REHAB
Cardiac Rehab Phase I    Activity:  Distance Bedrest post procedure  Assistance needed   Patient tolerated activity . Education:  Handouts provided and reviewed: [address] Diet: Healthy Cardiac diet reviewed.     Disease Proces

## 2018-07-31 VITALS
RESPIRATION RATE: 18 BRPM | WEIGHT: 128.13 LBS | HEART RATE: 70 BPM | OXYGEN SATURATION: 94 % | TEMPERATURE: 98 F | DIASTOLIC BLOOD PRESSURE: 57 MMHG | SYSTOLIC BLOOD PRESSURE: 134 MMHG

## 2018-07-31 LAB
ANION GAP SERPL CALC-SCNC: 7 MMOL/L (ref 0–18)
BUN SERPL-MCNC: 28 MG/DL (ref 8–20)
BUN/CREAT SERPL: 30.8 (ref 10–20)
CALCIUM SERPL-MCNC: 9.8 MG/DL (ref 8.5–10.5)
CHLORIDE SERPL-SCNC: 105 MMOL/L (ref 95–110)
CO2 SERPL-SCNC: 29 MMOL/L (ref 22–32)
CREAT SERPL-MCNC: 0.91 MG/DL (ref 0.5–1.5)
GLUCOSE SERPL-MCNC: 136 MG/DL (ref 70–99)
MAGNESIUM SERPL-MCNC: 2.3 MG/DL (ref 1.8–2.5)
OSMOLALITY UR CALC.SUM OF ELEC: 300 MOSM/KG (ref 275–295)
POTASSIUM SERPL-SCNC: 4.6 MMOL/L (ref 3.3–5.1)
SODIUM SERPL-SCNC: 141 MMOL/L (ref 136–144)

## 2018-07-31 PROCEDURE — 99239 HOSP IP/OBS DSCHRG MGMT >30: CPT | Performed by: HOSPITALIST

## 2018-07-31 RX ORDER — CARVEDILOL 6.25 MG/1
6.25 TABLET ORAL 2 TIMES DAILY WITH MEALS
Qty: 30 TABLET | Refills: 0 | Status: SHIPPED | OUTPATIENT
Start: 2018-07-31 | End: 2018-08-07

## 2018-07-31 RX ORDER — LISINOPRIL 5 MG/1
5 TABLET ORAL DAILY
Qty: 30 TABLET | Refills: 0 | Status: SHIPPED | OUTPATIENT
Start: 2018-08-01 | End: 2018-08-14

## 2018-07-31 RX ORDER — ASPIRIN 81 MG/1
81 TABLET ORAL DAILY
Qty: 30 TABLET | Refills: 0 | Status: SHIPPED | OUTPATIENT
Start: 2018-08-01 | End: 2018-12-17

## 2018-07-31 RX ORDER — ATORVASTATIN CALCIUM 20 MG/1
20 TABLET, FILM COATED ORAL NIGHTLY
Qty: 30 TABLET | Refills: 0 | Status: SHIPPED | OUTPATIENT
Start: 2018-07-31 | End: 2021-01-20 | Stop reason: ALTCHOICE

## 2018-07-31 RX ORDER — CLOPIDOGREL BISULFATE 75 MG/1
75 TABLET ORAL DAILY
Qty: 30 TABLET | Refills: 0 | Status: SHIPPED | OUTPATIENT
Start: 2018-08-01 | End: 2021-01-20

## 2018-07-31 RX ORDER — FUROSEMIDE 20 MG/1
20 TABLET ORAL DAILY
Qty: 30 TABLET | Refills: 0 | Status: SHIPPED | OUTPATIENT
Start: 2018-08-01 | End: 2021-01-20

## 2018-07-31 RX ORDER — POTASSIUM CHLORIDE 750 MG/1
10 TABLET, EXTENDED RELEASE ORAL DAILY
Qty: 30 TABLET | Refills: 0 | Status: SHIPPED | OUTPATIENT
Start: 2018-08-01 | End: 2018-10-24

## 2018-07-31 RX ORDER — CARVEDILOL 6.25 MG/1
6.25 TABLET ORAL 2 TIMES DAILY WITH MEALS
Status: DISCONTINUED | OUTPATIENT
Start: 2018-07-31 | End: 2018-07-31

## 2018-07-31 NOTE — TRANSITION NOTE
[facility]    Cardiology Discharge Summary    Eduardo Wardjudson Patient Status:  Inpatient    1945 MRN [id number]   Location [facility] 3W/SW Attending Keenan Powell Day # 5 PCP None Pcp       Discharge Summary taking on:  8/1/2018  Notes to patient:  Lowers blood pressure and makes your heart stronger      Take 1 tablet (5 mg total) by mouth daily.    Quantity:  30 tablet  Refills:  0     Potassium Chloride ER 10 MEQ Tbcr  Commonly known as:  K-DUR  Start taking

## 2018-07-31 NOTE — CM/SW NOTE
7/31/18 CM Discharge planning   Referral made to Financial Counselor for IPA consideration.    Mata Rush X [id number]

## 2018-07-31 NOTE — DISCHARGE SUMMARY
More than 30 min spent on dc  Dc summary # [id number]  Hospital Discharge Diagnoses: nstemi    Lace+ Score: 70  59-90 High Risk  29-58 Medium Risk  0-28   Low Risk. TCM Follow-Up Recommendation:  LACE 29-58:  Moderate Risk of readmission after discharge fr

## 2018-07-31 NOTE — PLAN OF CARE
Problem: Patient Centered Care  Goal: Patient preferences are identified and integrated in the patient's plan of care  Interventions:  - What would you like us to know as we care for you? I am nervous about my health. I feel better when my family is here. 07/31/18      Problem: Altered Communication/Language Barrier  Goal: Patient/Family is able to understand and participate in their care  Interventions:  - Assess communication ability and preferred communication style  - Implement communication aides and s respiratory effort  - Oxygen supplementation based on oxygen saturation or ABGs  - Provide Smoking Cessation handout, if applicable  - Encourage broncho-pulmonary hygiene including cough, deep breathe, Incentive Spirometry  - Assess the need for suctioning defined limits  Goal: Pt's integumentary status will be adequate for discharge  Outcome: Completed Date Met: 07/31/18

## 2018-07-31 NOTE — PLAN OF CARE
Problem: Patient Centered Care  Goal: Patient preferences are identified and integrated in the patient's plan of care  Interventions:  - What would you like us to know as we care for you? I am nervous about my health. I feel better when my family is here.

## 2018-07-31 NOTE — PROGRESS NOTES
[facility]    Cardiology Progress Note  [facility]    Luana Valladares Patient Status:  Inpatient    1945 MRN [id number]   Location [facility] 3W/SW Attending Keenan Powell Day # 5 PCP Results  Component Value Date   WBC 6.6 07/30/2018   HGB 12.8 07/30/2018   HCT 38.4 07/30/2018    07/30/2018   CREATSERUM 0.91 07/31/2018   BUN 28 (H) 07/31/2018    07/31/2018   K 4.6 07/31/2018    07/31/2018   CO2 29 07/31/2018   GLU 13

## 2018-08-01 ENCOUNTER — TELEPHONE (OUTPATIENT)
Dept: CARDIOLOGY UNIT | Facility: HOSPITAL | Age: 73
End: 2018-08-01

## 2018-08-01 NOTE — DISCHARGE SUMMARY
[facility]    PATIENT'S NAME: Alissa Macdonald   ATTENDING PHYSICIAN: Viviana Powell MD   PATIENT ACCOUNT#:   [id number]    LOCATION:  [address] [place] [address] #:   [id number]       YOB: 1945  ADMISSION DATE:       07/25/20 BiPAP and appears not to need a LifeVest at this time. Please note, the patient's respiratory rate was 44, the heart rate was 128, and O2 saturation was 65% on room air upon her arrival to justify the diagnosis of acute respiratory failure.   2.   Hyperten

## 2018-08-07 ENCOUNTER — PRIOR ORIGINAL RECORDS (OUTPATIENT)
Dept: OTHER | Age: 73
End: 2018-08-07

## 2018-08-07 ENCOUNTER — OFFICE VISIT (OUTPATIENT)
Dept: CARDIOLOGY CLINIC | Facility: HOSPITAL | Age: 73
End: 2018-08-07
Attending: INTERNAL MEDICINE
Payer: MEDICARE

## 2018-08-07 VITALS
WEIGHT: 133.38 LBS | OXYGEN SATURATION: 97 % | DIASTOLIC BLOOD PRESSURE: 66 MMHG | HEART RATE: 64 BPM | SYSTOLIC BLOOD PRESSURE: 139 MMHG

## 2018-08-07 DIAGNOSIS — I50.9 HEART FAILURE, UNSPECIFIED (HCC): Primary | ICD-10-CM

## 2018-08-07 DIAGNOSIS — I50.23 ACUTE ON CHRONIC SYSTOLIC HEART FAILURE (HCC): ICD-10-CM

## 2018-08-07 PROBLEM — I21.02 ST ELEVATION MYOCARDIAL INFARCTION INVOLVING LEFT ANTERIOR DESCENDING (LAD) CORONARY ARTERY (HCC): Status: ACTIVE | Noted: 2018-08-07

## 2018-08-07 LAB
ANION GAP SERPL CALC-SCNC: 9 MMOL/L (ref 0–18)
BNP SERPL-MCNC: 737 PG/ML (ref 0–100)
BUN SERPL-MCNC: 22 MG/DL (ref 8–20)
BUN/CREAT SERPL: 25 (ref 10–20)
CALCIUM SERPL-MCNC: 8.9 MG/DL (ref 8.5–10.5)
CHLORIDE SERPL-SCNC: 104 MMOL/L (ref 95–110)
CO2 SERPL-SCNC: 25 MMOL/L (ref 22–32)
CREAT SERPL-MCNC: 0.88 MG/DL (ref 0.5–1.5)
GLUCOSE SERPL-MCNC: 144 MG/DL (ref 70–99)
OSMOLALITY UR CALC.SUM OF ELEC: 292 MOSM/KG (ref 275–295)
POTASSIUM SERPL-SCNC: 4.5 MMOL/L (ref 3.3–5.1)
SODIUM SERPL-SCNC: 138 MMOL/L (ref 136–144)

## 2018-08-07 PROCEDURE — 99214 OFFICE O/P EST MOD 30 MIN: CPT | Performed by: CLINICAL NURSE SPECIALIST

## 2018-08-07 PROCEDURE — 99211 OFF/OP EST MAY X REQ PHY/QHP: CPT | Performed by: CLINICAL NURSE SPECIALIST

## 2018-08-07 PROCEDURE — 80048 BASIC METABOLIC PNL TOTAL CA: CPT | Performed by: CLINICAL NURSE SPECIALIST

## 2018-08-07 PROCEDURE — 83880 ASSAY OF NATRIURETIC PEPTIDE: CPT | Performed by: CLINICAL NURSE SPECIALIST

## 2018-08-07 PROCEDURE — 36415 COLL VENOUS BLD VENIPUNCTURE: CPT | Performed by: CLINICAL NURSE SPECIALIST

## 2018-08-07 RX ORDER — CARVEDILOL 6.25 MG/1
6.25 TABLET ORAL 2 TIMES DAILY WITH MEALS
Qty: 60 TABLET | Refills: 0 | Status: SHIPPED | OUTPATIENT
Start: 2018-08-07 | End: 2018-11-21

## 2018-08-07 NOTE — PROGRESS NOTES
[facility] Patient Status:  Outpatient    1945 MRN [id number]   Location MD Dr Debbie Reyes is a 68year old female who presents to clinic for as PM   MG 2.3 07/31/2018 05:45 AM   TROP 0.68 (HH) 07/26/2018 04:27 PM       Clinical labs drawn by MA: BMP stable. BNP trending down.     /66   Pulse 64   Wt 133 lb 6.4 oz (60.5 kg)   SpO2 97%     D'c weight 128  Clinic weights: 1) 133    General appea Making:   Here today post hospitalization for HFrEF and NSTEMI. Right groin cath site c/d/i. Bruising resolving, soft, no hematoma. Denies pain, numbness, or tingling. Euvolemic on exam today. Denies ever having lower extremity edema.  Dtr states that w

## 2018-08-07 NOTE — PATIENT INSTRUCTIONS
Continue current medications    Continue tracking daily weights. Call with weight gain of 3 lbs overnight or concerning symptoms. [phone]    32-52 oz fluid restriction    Less than 2000 mg sodium/salt diet.  Common high sodium foods include frozen di

## 2018-08-14 ENCOUNTER — OFFICE VISIT (OUTPATIENT)
Dept: FAMILY MEDICINE CLINIC | Facility: CLINIC | Age: 73
End: 2018-08-14
Payer: MEDICARE

## 2018-08-14 VITALS
SYSTOLIC BLOOD PRESSURE: 160 MMHG | DIASTOLIC BLOOD PRESSURE: 88 MMHG | HEART RATE: 65 BPM | TEMPERATURE: 99 F | WEIGHT: 131 LBS

## 2018-08-14 DIAGNOSIS — I10 ESSENTIAL HYPERTENSION: ICD-10-CM

## 2018-08-14 DIAGNOSIS — I25.10 CORONARY ARTERY DISEASE INVOLVING NATIVE CORONARY ARTERY OF NATIVE HEART WITHOUT ANGINA PECTORIS: Primary | ICD-10-CM

## 2018-08-14 PROCEDURE — 1111F DSCHRG MED/CURRENT MED MERGE: CPT | Performed by: FAMILY MEDICINE

## 2018-08-14 PROCEDURE — 99495 TRANSJ CARE MGMT MOD F2F 14D: CPT | Performed by: FAMILY MEDICINE

## 2018-08-14 RX ORDER — LISINOPRIL 10 MG/1
10 TABLET ORAL DAILY
Qty: 30 TABLET | Refills: 2 | Status: SHIPPED | OUTPATIENT
Start: 2018-08-14 | End: 2018-10-24

## 2018-08-14 NOTE — PROGRESS NOTES
8/14/2018  2:19 PM    Floridalma Buckner is a 68year old female. Chief complaint(s): Patient presents with:  Hospital F/U: Patient was in the hospital for a heart attack. Sx also include fatigue    HPI:     Floridalma Buckner primary complaint is regarding CAD. moderate    Patient seen within 14 days from date of discharge. Patient presents with CAD. she is here today for routine follow-up. Lindsay Loya has not  a prior history of a myocardial infarction and recently ha angioplasty.   her heart disease wa tablet (10 mEq total) by mouth daily. Disp: 30 tablet Rfl: 0   Clopidogrel Bisulfate 75 MG Oral Tab Take 1 tablet (75 mg total) by mouth daily.  Disp: 30 tablet Rfl: 0       Allergies:  No Known Allergies      ROS:   Review of Systems   Constitutional: Kevyn Chaudhari Calculated Osmolality 292 275 - 295 mOsm/kg   GFR, Non-African American >60 >=60   GFR, -American >60 >=60   -BNP (B TYPE NATRIUERTIC PEPTIDE)   Result Value Ref Range   Beta Natriuretic Peptide 737 (H) 0 - 100 pg/mL     EKG / Spirometry : -     R more than left may be related to underlying pulmonary edema. Can't exclude underlying pneumonia. Small right pleural effusion and possible left pleural effusion. BONES: No fracture or visible bony lesion. OTHER: Negative. CONCLUSION:  1.  Persistent b hypercholesterolemic. Elevated troponin  TECHNIQUE:   Single view. FINDINGS:  CARDIAC/VASC: Cardiomegaly. Central vascular congestion with moderate bilateral perihilar consolidations. MEDIAST/PATTI:   No visible mass or adenopathy.  Atherosclerotic aorta w or concerns. Notify Dr Chris Carson or the [facility] if there is a deterioration or worsening of the medical condition. Also, inform the doctor with any new symptoms or medications' side effects.   Minimize activity until clear by cardiology and cardiac r

## 2018-08-16 ENCOUNTER — TELEPHONE (OUTPATIENT)
Dept: FAMILY MEDICINE CLINIC | Facility: CLINIC | Age: 73
End: 2018-08-16

## 2018-08-16 ENCOUNTER — PRIOR ORIGINAL RECORDS (OUTPATIENT)
Dept: OTHER | Age: 73
End: 2018-08-16

## 2018-08-16 LAB
BUN: 22 MG/DL
CALCIUM: 8.9 MG/DL
CHLORIDE: 104 MEQ/L
CHOLESTEROL, TOTAL: 187 MG/DL
CREATININE, SERUM: 0.88 MG/DL
GLUCOSE: 144 MG/DL
HDL CHOLESTEROL: 57 MG/DL
HEMATOCRIT: 38.4 %
HEMOGLOBIN: 12.8 G/DL
LDL CHOLESTEROL: 111 MG/DL
NON-HDL CHOLESTEROL: 130 MG/DL
PLATELETS: 271 K/UL
POTASSIUM, SERUM: 4.5 MEQ/L
RED BLOOD COUNT: 4.14 X 10-6/U
SODIUM: 138 MEQ/L
TRIGLYCERIDES: 93 MG/DL
WHITE BLOOD COUNT: 6.6 X 10-3/U

## 2018-08-17 NOTE — TELEPHONE ENCOUNTER
Dr. Angelita Garcia for dtr to care for mom pending in DANIEL. Pt's daughter is requesting intermittent time off of 1-4 days per month for next 12 months as needed to care for her mom. Do you approve? Please advise.     Thank you,  [facility]

## 2018-08-27 ENCOUNTER — OFFICE VISIT (OUTPATIENT)
Dept: CARDIOLOGY CLINIC | Facility: HOSPITAL | Age: 73
End: 2018-08-27
Attending: INTERNAL MEDICINE
Payer: MEDICARE

## 2018-08-27 VITALS
OXYGEN SATURATION: 97 % | DIASTOLIC BLOOD PRESSURE: 68 MMHG | WEIGHT: 129.88 LBS | HEART RATE: 56 BPM | SYSTOLIC BLOOD PRESSURE: 177 MMHG

## 2018-08-27 DIAGNOSIS — I50.23 ACUTE ON CHRONIC SYSTOLIC HEART FAILURE (HCC): Primary | ICD-10-CM

## 2018-08-27 PROCEDURE — 99212 OFFICE O/P EST SF 10 MIN: CPT | Performed by: CLINICAL NURSE SPECIALIST

## 2018-08-27 PROCEDURE — 99214 OFFICE O/P EST MOD 30 MIN: CPT | Performed by: CLINICAL NURSE SPECIALIST

## 2018-08-27 RX ORDER — LISINOPRIL 5 MG/1
5 TABLET ORAL DAILY
Qty: 30 TABLET | Refills: 0 | COMMUNITY
Start: 2018-08-27 | End: 2018-10-24

## 2018-08-27 RX ORDER — LISINOPRIL 5 MG/1
15 TABLET ORAL DAILY
Qty: 90 TABLET | Refills: 0 | Status: SHIPPED | OUTPATIENT
Start: 2018-08-27 | End: 2018-08-27

## 2018-08-27 NOTE — PATIENT INSTRUCTIONS
Increase lisinopril to 15 mg daily (10 mg in the morning and 5 mg in the evening)    Repeat labs on 9/14 (or the week of 9/10) before you see MARC Garcia at Dr Magalie Marquez office    Continue tracking daily weights.  Call with weight gain of 3 lbs overnight or

## 2018-08-27 NOTE — PROGRESS NOTES
0 [facility] Patient Status:  Outpatient    1945 MRN [id number]   Location MD Dr Libby Aguilar is a 68year old female who presents to clinic fo 07/25/2018 09:22 PM   PTP 14.2 07/25/2018 09:22 PM   MG 2.3 07/31/2018 05:45 AM   TROP 0.68 () 07/26/2018 04:27 PM       Clinical labs drawn by MA: NA    BP (!) 177/68   Pulse 56   Wt 129 lb 14.4 oz (58.9 kg)   SpO2 97%     D'c weight 128  Clinic weights diastolic dysfunction    Decision Making:   Here today post hospitalization for HFrEF and NSTEMI. Right groin cath site c/d/i. Bruising resolving, soft, no hematoma. Denies pain, numbness, or tingling. Remains euvolemic on exam today.  Per dtr, Dr Jenny Zapata

## 2018-09-05 ENCOUNTER — PRIOR ORIGINAL RECORDS (OUTPATIENT)
Dept: OTHER | Age: 73
End: 2018-09-05

## 2018-09-05 ENCOUNTER — LAB ENCOUNTER (OUTPATIENT)
Dept: LAB | Age: 73
End: 2018-09-05
Attending: FAMILY MEDICINE
Payer: MEDICARE

## 2018-09-05 DIAGNOSIS — I25.10 CORONARY ARTERY DISEASE INVOLVING NATIVE CORONARY ARTERY OF NATIVE HEART WITHOUT ANGINA PECTORIS: ICD-10-CM

## 2018-09-05 LAB
ALBUMIN SERPL BCP-MCNC: 4 G/DL (ref 3.5–4.8)
ALBUMIN/GLOB SERPL: 1.2 {RATIO} (ref 1–2)
ALP SERPL-CCNC: 40 U/L (ref 32–100)
ALT SERPL-CCNC: 11 U/L (ref 14–54)
ANION GAP SERPL CALC-SCNC: 9 MMOL/L (ref 0–18)
AST SERPL-CCNC: 16 U/L (ref 15–41)
BASOPHILS # BLD: 0 K/UL (ref 0–0.2)
BASOPHILS NFR BLD: 1 %
BILIRUB SERPL-MCNC: 0.7 MG/DL (ref 0.3–1.2)
BUN SERPL-MCNC: 13 MG/DL (ref 8–20)
BUN/CREAT SERPL: 13.8 (ref 10–20)
CALCIUM SERPL-MCNC: 9.1 MG/DL (ref 8.5–10.5)
CHLORIDE SERPL-SCNC: 104 MMOL/L (ref 95–110)
CHOLEST SERPL-MCNC: 113 MG/DL (ref 110–200)
CO2 SERPL-SCNC: 27 MMOL/L (ref 22–32)
CREAT SERPL-MCNC: 0.94 MG/DL (ref 0.5–1.5)
EOSINOPHIL # BLD: 0.2 K/UL (ref 0–0.7)
EOSINOPHIL NFR BLD: 3 %
ERYTHROCYTE [DISTWIDTH] IN BLOOD BY AUTOMATED COUNT: 12.7 % (ref 11–15)
GLOBULIN PLAS-MCNC: 3.4 G/DL (ref 2.5–3.7)
GLUCOSE SERPL-MCNC: 101 MG/DL (ref 70–99)
HCT VFR BLD AUTO: 37.5 % (ref 35–48)
HDLC SERPL-MCNC: 49 MG/DL
HGB BLD-MCNC: 12.6 G/DL (ref 12–16)
LDLC SERPL CALC-MCNC: 52 MG/DL (ref 0–99)
LYMPHOCYTES # BLD: 1.8 K/UL (ref 1–4)
LYMPHOCYTES NFR BLD: 24 %
MCH RBC QN AUTO: 30.8 PG (ref 27–32)
MCHC RBC AUTO-ENTMCNC: 33.6 G/DL (ref 32–37)
MCV RBC AUTO: 91.5 FL (ref 80–100)
MONOCYTES # BLD: 0.4 K/UL (ref 0–1)
MONOCYTES NFR BLD: 6 %
NEUTROPHILS # BLD AUTO: 4.7 K/UL (ref 1.8–7.7)
NEUTROPHILS NFR BLD: 66 %
NONHDLC SERPL-MCNC: 64 MG/DL
OSMOLALITY UR CALC.SUM OF ELEC: 290 MOSM/KG (ref 275–295)
PATIENT FASTING: YES
PLATELET # BLD AUTO: 204 K/UL (ref 140–400)
PMV BLD AUTO: 8.8 FL (ref 7.4–10.3)
POTASSIUM SERPL-SCNC: 4.6 MMOL/L (ref 3.3–5.1)
PROT SERPL-MCNC: 7.4 G/DL (ref 5.9–8.4)
RBC # BLD AUTO: 4.1 M/UL (ref 3.7–5.4)
SODIUM SERPL-SCNC: 140 MMOL/L (ref 136–144)
TRIGL SERPL-MCNC: 58 MG/DL (ref 1–149)
WBC # BLD AUTO: 7.2 K/UL (ref 4–11)

## 2018-09-05 PROCEDURE — 80061 LIPID PANEL: CPT

## 2018-09-05 PROCEDURE — 80053 COMPREHEN METABOLIC PANEL: CPT

## 2018-09-05 PROCEDURE — 36415 COLL VENOUS BLD VENIPUNCTURE: CPT

## 2018-09-05 PROCEDURE — 85025 COMPLETE CBC W/AUTO DIFF WBC: CPT

## 2018-09-13 LAB
ALBUMIN: 4 G/DL
ALKALINE PHOSPHATATE(ALK PHOS): 40 IU/L
ALT (SGPT): 11 U/L
AST (SGOT): 16 U/L
BILIRUBIN TOTAL: 0.7 MG/DL
BUN: 13 MG/DL
CALCIUM: 9.1 MG/DL
CHLORIDE: 104 MEQ/L
CHOLESTEROL, TOTAL: 113 MG/DL
CREATININE, SERUM: 0.94 MG/DL
GLOBULIN: 3.4 G/DL
GLUCOSE: 101 MG/DL
GLUCOSE: 101 MG/DL
HDL CHOLESTEROL: 49 MG/DL
HEMATOCRIT: 37.5 %
HEMOGLOBIN: 12.6 G/DL
LDL CHOLESTEROL: 52 MG/DL
NON-HDL CHOLESTEROL: 64 MG/DL
PLATELETS: 204 K/UL
POTASSIUM, SERUM: 4.6 MEQ/L
PROTEIN, TOTAL: 7.4 G/DL
RED BLOOD COUNT: 4.1 X 10-6/U
SGOT (AST): 16 IU/L
SGPT (ALT): 11 IU/L
SODIUM: 140 MEQ/L
TRIGLYCERIDES: 58 MG/DL
WHITE BLOOD COUNT: 7.2 X 10-3/U

## 2018-09-14 ENCOUNTER — PRIOR ORIGINAL RECORDS (OUTPATIENT)
Dept: OTHER | Age: 73
End: 2018-09-14

## 2018-09-26 ENCOUNTER — PRIOR ORIGINAL RECORDS (OUTPATIENT)
Dept: OTHER | Age: 73
End: 2018-09-26

## 2018-10-09 ENCOUNTER — TELEPHONE (OUTPATIENT)
Dept: FAMILY MEDICINE CLINIC | Facility: CLINIC | Age: 73
End: 2018-10-09

## 2018-10-09 NOTE — TELEPHONE ENCOUNTER
Patient has follow up scheduled for 10/16/2018 and needs to have labs ordered. Patient wants to get labs drown tomorrow.      Please advise

## 2018-10-09 NOTE — TELEPHONE ENCOUNTER
Per Dr Matteo Slaughter patient does not need labs recent labs drawn in Sept 2018 were normal. Patient to keep present appt.

## 2018-10-11 ENCOUNTER — APPOINTMENT (OUTPATIENT)
Dept: LAB | Age: 73
End: 2018-10-11
Attending: INTERNAL MEDICINE
Payer: MEDICARE

## 2018-10-11 ENCOUNTER — PRIOR ORIGINAL RECORDS (OUTPATIENT)
Dept: OTHER | Age: 73
End: 2018-10-11

## 2018-10-11 DIAGNOSIS — E78.00 PURE HYPERCHOLESTEROLEMIA: ICD-10-CM

## 2018-10-11 PROCEDURE — 84450 TRANSFERASE (AST) (SGOT): CPT

## 2018-10-11 PROCEDURE — 84460 ALANINE AMINO (ALT) (SGPT): CPT

## 2018-10-11 PROCEDURE — 36415 COLL VENOUS BLD VENIPUNCTURE: CPT

## 2018-10-11 PROCEDURE — 80061 LIPID PANEL: CPT

## 2018-10-12 ENCOUNTER — PRIOR ORIGINAL RECORDS (OUTPATIENT)
Dept: OTHER | Age: 73
End: 2018-10-12

## 2018-10-12 LAB
ALT (SGPT): 11 U/L
AST (SGOT): 18 U/L
CHOLESTEROL, TOTAL: 116 MG/DL
HDL CHOLESTEROL: 50 MG/DL
LDL CHOLESTEROL: 56 MG/DL
NON-HDL CHOLESTEROL: 66 MG/DL
TRIGLYCERIDES: 52 MG/DL

## 2018-10-23 ENCOUNTER — PRIOR ORIGINAL RECORDS (OUTPATIENT)
Dept: OTHER | Age: 73
End: 2018-10-23

## 2018-10-24 ENCOUNTER — OFFICE VISIT (OUTPATIENT)
Dept: FAMILY MEDICINE CLINIC | Facility: CLINIC | Age: 73
End: 2018-10-24
Payer: MEDICARE

## 2018-10-24 VITALS
HEART RATE: 60 BPM | DIASTOLIC BLOOD PRESSURE: 68 MMHG | SYSTOLIC BLOOD PRESSURE: 190 MMHG | WEIGHT: 124 LBS | TEMPERATURE: 99 F

## 2018-10-24 DIAGNOSIS — I10 ESSENTIAL HYPERTENSION: Primary | ICD-10-CM

## 2018-10-24 DIAGNOSIS — I25.10 CORONARY ARTERY DISEASE INVOLVING NATIVE CORONARY ARTERY OF NATIVE HEART WITHOUT ANGINA PECTORIS: ICD-10-CM

## 2018-10-24 PROCEDURE — 99214 OFFICE O/P EST MOD 30 MIN: CPT | Performed by: FAMILY MEDICINE

## 2018-10-24 PROCEDURE — G0463 HOSPITAL OUTPT CLINIC VISIT: HCPCS | Performed by: FAMILY MEDICINE

## 2018-10-24 RX ORDER — LISINOPRIL 10 MG/1
TABLET ORAL
Qty: 90 TABLET | Refills: 0 | Status: SHIPPED | OUTPATIENT
Start: 2018-10-24 | End: 2018-11-21

## 2018-10-24 RX ORDER — LISINOPRIL 10 MG/1
10 TABLET ORAL 2 TIMES DAILY
Qty: 60 TABLET | Refills: 2 | Status: SHIPPED | OUTPATIENT
Start: 2018-10-24 | End: 2018-12-17

## 2018-10-24 NOTE — PROGRESS NOTES
10/24/2018  10:59 AM    Albania De Los Santos is a 68year old female. Chief complaint(s): Patient presents with: Follow - Up  HTN    HPI:     Albania De Los Santos primary complaint is regarding CAD/ HTN. Patient presents with CAD/ NTN.   She is here today for 1 tablet (20 mg total) by mouth daily. Disp: 30 tablet Rfl: 0   Clopidogrel Bisulfate 75 MG Oral Tab Take 1 tablet (75 mg total) by mouth daily.  Disp: 30 tablet Rfl: 0   carvedilol 6.25 MG Oral Tab Take 1 tablet (6.25 mg total) by mouth 2 (two) times daily mg/dL    Non HDL Chol 66 <130 mg/dL    LDL Cholesterol 56 0 - 99 mg/dL       EKG / Spirometry : -     Radiology: No results found.     ASSESSMENT/PLAN:   Assessment   Essential hypertension  (primary encounter diagnosis)  Coronary artery disease involving n

## 2018-10-25 ENCOUNTER — TELEPHONE (OUTPATIENT)
Dept: FAMILY MEDICINE CLINIC | Facility: CLINIC | Age: 73
End: 2018-10-25

## 2018-10-25 NOTE — TELEPHONE ENCOUNTER
Pharmacy called in to clarify which script should be given to Pt. Please advise.        Current Outpatient Medications:   •  LISINOPRIL 10 MG Oral Tab, TAKE 1 TABLET BY MOUTH ONCE DAILY, Disp: 90 tablet, Rfl: 0    •  lisinopril 10 MG Oral Tab, Take 1 ta

## 2018-10-25 NOTE — TELEPHONE ENCOUNTER
Refill passed per [facility] protocol.     Hypertensive Medications  Protocol Criteria:  · Appointment scheduled in the past 6 months or in the next 3 months  · BMP or CMP in the past 12 months  · Creatinine result < 2  Recent Outpatient Visits

## 2018-11-06 ENCOUNTER — PRIOR ORIGINAL RECORDS (OUTPATIENT)
Dept: OTHER | Age: 73
End: 2018-11-06

## 2018-11-09 ENCOUNTER — PRIOR ORIGINAL RECORDS (OUTPATIENT)
Dept: OTHER | Age: 73
End: 2018-11-09

## 2018-11-12 ENCOUNTER — MYAURORA ACCOUNT LINK (OUTPATIENT)
Dept: OTHER | Age: 73
End: 2018-11-12

## 2018-11-12 ENCOUNTER — PRIOR ORIGINAL RECORDS (OUTPATIENT)
Dept: OTHER | Age: 73
End: 2018-11-12

## 2018-11-19 ENCOUNTER — PRIOR ORIGINAL RECORDS (OUTPATIENT)
Dept: OTHER | Age: 73
End: 2018-11-19

## 2018-11-19 LAB
CHOLESTEROL, TOTAL: 171 MG/DL
HDL CHOLESTEROL: 73 MG/DL
LDL CHOLESTEROL: 83 MG/DL
NON-HDL CHOLESTEROL: 98 MG/DL
TRIGLYCERIDES: 71 MG/DL

## 2018-11-20 ENCOUNTER — PRIOR ORIGINAL RECORDS (OUTPATIENT)
Dept: OTHER | Age: 73
End: 2018-11-20

## 2018-11-20 ENCOUNTER — LAB ENCOUNTER (OUTPATIENT)
Dept: LAB | Age: 73
DRG: 378 | End: 2018-11-20
Attending: INTERNAL MEDICINE
Payer: MEDICARE

## 2018-11-20 DIAGNOSIS — I10 HTN (HYPERTENSION): Primary | ICD-10-CM

## 2018-11-20 PROCEDURE — 80048 BASIC METABOLIC PNL TOTAL CA: CPT

## 2018-11-20 PROCEDURE — 36415 COLL VENOUS BLD VENIPUNCTURE: CPT

## 2018-11-21 ENCOUNTER — OFFICE VISIT (OUTPATIENT)
Dept: FAMILY MEDICINE CLINIC | Facility: CLINIC | Age: 73
End: 2018-11-21
Payer: MEDICARE

## 2018-11-21 ENCOUNTER — HOSPITAL ENCOUNTER (INPATIENT)
Facility: HOSPITAL | Age: 73
LOS: 2 days | Discharge: HOME | DRG: 378 | End: 2018-11-23
Attending: EMERGENCY MEDICINE | Admitting: HOSPITALIST
Payer: MEDICARE

## 2018-11-21 ENCOUNTER — PRIOR ORIGINAL RECORDS (OUTPATIENT)
Dept: OTHER | Age: 73
End: 2018-11-21

## 2018-11-21 ENCOUNTER — APPOINTMENT (OUTPATIENT)
Dept: CV DIAGNOSTICS | Facility: HOSPITAL | Age: 73
DRG: 378 | End: 2018-11-21
Attending: HOSPITALIST
Payer: MEDICARE

## 2018-11-21 ENCOUNTER — APPOINTMENT (OUTPATIENT)
Dept: CT IMAGING | Facility: HOSPITAL | Age: 73
DRG: 378 | End: 2018-11-21
Attending: EMERGENCY MEDICINE
Payer: MEDICARE

## 2018-11-21 VITALS
SYSTOLIC BLOOD PRESSURE: 144 MMHG | WEIGHT: 118.31 LBS | HEIGHT: 57 IN | RESPIRATION RATE: 16 BRPM | DIASTOLIC BLOOD PRESSURE: 62 MMHG | TEMPERATURE: 99 F | BODY MASS INDEX: 25.52 KG/M2 | HEART RATE: 75 BPM

## 2018-11-21 DIAGNOSIS — K92.2 GASTROINTESTINAL HEMORRHAGE, UNSPECIFIED GASTROINTESTINAL HEMORRHAGE TYPE: ICD-10-CM

## 2018-11-21 DIAGNOSIS — D64.9 ANEMIA, UNSPECIFIED TYPE: ICD-10-CM

## 2018-11-21 DIAGNOSIS — K21.9 GASTROESOPHAGEAL REFLUX DISEASE WITHOUT ESOPHAGITIS: ICD-10-CM

## 2018-11-21 DIAGNOSIS — R55 SYNCOPE, UNSPECIFIED SYNCOPE TYPE: Primary | ICD-10-CM

## 2018-11-21 DIAGNOSIS — K92.1 HEMATOCHEZIA: Primary | ICD-10-CM

## 2018-11-21 DIAGNOSIS — H93.11 TINNITUS OF RIGHT EAR: ICD-10-CM

## 2018-11-21 PROBLEM — R73.9 HYPERGLYCEMIA: Status: ACTIVE | Noted: 2018-11-21

## 2018-11-21 PROBLEM — R79.89 AZOTEMIA: Status: ACTIVE | Noted: 2018-11-21

## 2018-11-21 PROBLEM — E87.6 HYPOKALEMIA: Status: ACTIVE | Noted: 2018-11-21

## 2018-11-21 LAB
BUN: 29 MG/DL
CALCIUM: 9.4 MG/DL
CHLORIDE: 101 MEQ/L
CREATININE, SERUM: 1.07 MG/DL
GLUCOSE: 166 MG/DL
POTASSIUM, SERUM: 4 MEQ/L
SODIUM: 139 MEQ/L

## 2018-11-21 PROCEDURE — G0463 HOSPITAL OUTPT CLINIC VISIT: HCPCS | Performed by: FAMILY MEDICINE

## 2018-11-21 PROCEDURE — 70450 CT HEAD/BRAIN W/O DYE: CPT | Performed by: EMERGENCY MEDICINE

## 2018-11-21 PROCEDURE — 93306 TTE W/DOPPLER COMPLETE: CPT | Performed by: HOSPITALIST

## 2018-11-21 PROCEDURE — 99223 1ST HOSP IP/OBS HIGH 75: CPT | Performed by: HOSPITALIST

## 2018-11-21 PROCEDURE — 99223 1ST HOSP IP/OBS HIGH 75: CPT | Performed by: INTERNAL MEDICINE

## 2018-11-21 PROCEDURE — 99214 OFFICE O/P EST MOD 30 MIN: CPT | Performed by: FAMILY MEDICINE

## 2018-11-21 PROCEDURE — 30233N1 TRANSFUSION OF NONAUTOLOGOUS RED BLOOD CELLS INTO PERIPHERAL VEIN, PERCUTANEOUS APPROACH: ICD-10-PCS | Performed by: HOSPITALIST

## 2018-11-21 RX ORDER — HYDRALAZINE HYDROCHLORIDE 20 MG/ML
INJECTION INTRAMUSCULAR; INTRAVENOUS
Status: DISCONTINUED
Start: 2018-11-21 | End: 2018-11-21 | Stop reason: WASHOUT

## 2018-11-21 RX ORDER — ONDANSETRON 2 MG/ML
4 INJECTION INTRAMUSCULAR; INTRAVENOUS EVERY 6 HOURS PRN
Status: DISCONTINUED | OUTPATIENT
Start: 2018-11-21 | End: 2018-11-23

## 2018-11-21 RX ORDER — HYDRALAZINE HYDROCHLORIDE 20 MG/ML
10 INJECTION INTRAMUSCULAR; INTRAVENOUS ONCE
Status: DISCONTINUED | OUTPATIENT
Start: 2018-11-21 | End: 2018-11-21

## 2018-11-21 RX ORDER — LISINOPRIL 10 MG/1
10 TABLET ORAL
Status: DISCONTINUED | OUTPATIENT
Start: 2018-11-21 | End: 2018-11-22

## 2018-11-21 RX ORDER — SODIUM CHLORIDE 0.9 % (FLUSH) 0.9 %
3 SYRINGE (ML) INJECTION AS NEEDED
Status: DISCONTINUED | OUTPATIENT
Start: 2018-11-21 | End: 2018-11-23

## 2018-11-21 RX ORDER — SODIUM CHLORIDE 9 MG/ML
INJECTION, SOLUTION INTRAVENOUS
Status: COMPLETED
Start: 2018-11-21 | End: 2018-11-21

## 2018-11-21 RX ORDER — OMEPRAZOLE 40 MG/1
40 CAPSULE, DELAYED RELEASE ORAL DAILY
Qty: 30 CAPSULE | Refills: 1 | Status: ON HOLD | OUTPATIENT
Start: 2018-11-21 | End: 2018-11-23

## 2018-11-21 RX ORDER — CARVEDILOL 6.25 MG/1
6.25 TABLET ORAL 2 TIMES DAILY WITH MEALS
Status: DISCONTINUED | OUTPATIENT
Start: 2018-11-21 | End: 2018-11-23

## 2018-11-21 RX ORDER — ACETAMINOPHEN 325 MG/1
650 TABLET ORAL EVERY 6 HOURS PRN
Status: DISCONTINUED | OUTPATIENT
Start: 2018-11-21 | End: 2018-11-23

## 2018-11-21 RX ORDER — SODIUM CHLORIDE 9 MG/ML
INJECTION, SOLUTION INTRAVENOUS CONTINUOUS
Status: DISCONTINUED | OUTPATIENT
Start: 2018-11-21 | End: 2018-11-23

## 2018-11-21 NOTE — PROGRESS NOTES
11/21/2018  8:29 AM    Brain Ruiz is a 68year old female. Chief complaint(s): Patient presents with:  Heartburn  Vomiting    HPI:     Brain Ruiz primary complaint is regarding as above.      With regard to the GERD, the location of her pain an No    Drug use: No       Immunizations: There is no immunization history on file for this patient.     Medications (Active prior to today's visit):    Current Outpatient Medications:  Omeprazole 40 MG Oral Capsule Delayed Release Take 1 capsule (40 mg t Right Ear: Tympanic membrane, external ear and ear canal normal.   Left Ear: Tympanic membrane, external ear and ear canal normal.   Nose: No rhinorrhea. Mouth/Throat: Oropharynx is clear and moist.   Eyes: Conjunctivae are normal.   Neck: Neck supple. RECOMMENDATIONS given include: Please, call our office with any questions or concerns. Notify Dr José Antonio Sesay or the [facility] if there is a deterioration or worsening of the medical condition.  Also, inform the doctor with any new symptoms or medicatio

## 2018-11-21 NOTE — ED INITIAL ASSESSMENT (HPI)
Aisha Santiago is here via EMS for eval of syncopal episode this AM while at PMD's office. No trauma. Has had nausea/vomiting since this past Friday.

## 2018-11-21 NOTE — HISTORICAL OFFICE NOTE
Geovany Hidden  : 1945  ACCOUNT:  [id number]  [phone]  PCP: Dr. Vega Court     TODAY'S DATE: 2018  DICTATED BY:  MARC Barrett]      CHIEF COMPLAINT: [Followup of CAD, of native vessels, Followup of Hypertension, benign and vomit Allergies    MEDICATIONS: Selected prescriptions see below    VITAL SIGNS: [B/P - 170/60 , Pulse - 66, Weight -  117, Height -   50 , BMI - 32.9 ]    CONS: pale. WEIGHT: BMI parameters reviewed and discussed. HEAD/FACE: no trauma and normocephalic.  EYES: c Hypertension, benign      PLAN:  [Clear liquid diet  Resume your heart medicines with a few sips of water  Contact your family doctor about your nausea, vomiting and ear pain  Return to see the nurse practitioner in 1 month]    PRESCRIPTIONS:   11/12/18 *L allergies. PAST HISTORY:     PAST CV HISTORY: cardiomyopathy, RISHABH LAD 7/2018, dyslipidemia, hypertension and nonstemi    FAMILY HISTORY: Significant for premature CAD. SOCIAL HISTORY: SMOKING: Never used tobacco. denies smoking. CAFFEINE: rare.  Keenan Gunter benign      PLAN:  [Change lisinopril to 10 mg in the morning and 20 mg at night  Monitor and record her blood pressure at rest after 5 minutes for 1 week and call with results  Check blood test in 1 week nonfasting]    FOLLOWUP: [Return Visit in 6 Months]

## 2018-11-21 NOTE — CONSULTS
GI CONSULTATION:  Available medical records reviewed. Patient interviewed and examined. Please see orders and transcription. ( Dictated [id number]). Recommend transfusing 2 units PRBC now, then check H&H after transfusion.   Goal is to keep hemoglobin great

## 2018-11-21 NOTE — ED PROVIDER NOTES
Patient Seen in: [facility] AND [facility] Emergency Department    History   Patient presents with:  Syncope (cardiovascular, neurologic)    Stated Complaint:     HPI    Patient complains of syncopal episode.   Patient states that she has been sick since last Fri in HPI. Constitutional and vital signs reviewed. All other systems reviewed and negative except as noted above. PSFH elements reviewed from today and agreed except as otherwise stated in HPI.     Physical Exam     ED Triage Vitals [11/21/18 9336] WITH PLATELET.   Procedure                               Abnormality         Status                     ---------                               -----------         ------                     CBC W/ DIFFERENTIAL[[id number]]          Abnormal            Prelim admission            Disposition and Plan     Clinical Impression:  Hematochezia  (primary encounter diagnosis)  Gastrointestinal hemorrhage, unspecified gastrointestinal hemorrhage type  Anemia, unspecified type    Disposition:  There is no disposition on

## 2018-11-21 NOTE — H&P
[facility]    PATIENT'S NAME: Boy Heard   ATTENDING PHYSICIAN: Lizette Thrasher MD   PATIENT ACCOUNT#:   [id number]    LOCATION:  [address] [place] [address] #:   [id number]       YOB: 1945  ADMISSION DATE:       11/21/2 She does have dyspnea on exertion. Other 12-point review of system is negative. PHYSICAL EXAMINATION:    GENERAL:  Alert, oriented to time, place, and person. Mild to moderate distress.    VITAL SIGNS:  Temperature 98.2, pulse 67, respiratory rate 1

## 2018-11-21 NOTE — CONSULTS
[facility] HOSP - [facility]    Report of Consultation    [facility] Patient Status:  Inpatient    1945 MRN [id number]   Location [facility] 2W/SW Attending Ankita Curry MD   Hosp Day # 0 PCP Melvina Hill MD     Date of Admission does not check them regularly. She has had no recent angina or heart failure symptoms. She also denies shortness of breath. EKG shows sinus and left bundle branch block which is old. Her troponin is negative. Previous LVEF 35%.   Angiogram revealed 60% total) by mouth daily. Clopidogrel Bisulfate 75 MG Oral Tab Take 1 tablet (75 mg total) by mouth daily.        Allergies        No Known Allergies    Review of Systems:   Dizziness and recent nausea and vomiting otherwise  A comprehensive review of system 11/21/2018    INR 1.2 11/21/2018    PTP 14.7 (H) 11/21/2018    MG 2.3 07/31/2018    TROP 0.03 11/21/2018         Imaging:          Ct Brain Or Head ([id number])    Result Date: 11/21/2018  CONCLUSION:  1.  No acute intracranial process by noncontrast CT techniqu

## 2018-11-22 PROCEDURE — 99232 SBSQ HOSP IP/OBS MODERATE 35: CPT | Performed by: INTERNAL MEDICINE

## 2018-11-22 PROCEDURE — 99233 SBSQ HOSP IP/OBS HIGH 50: CPT | Performed by: HOSPITALIST

## 2018-11-22 RX ORDER — ATORVASTATIN CALCIUM 20 MG/1
20 TABLET, FILM COATED ORAL NIGHTLY
Status: DISCONTINUED | OUTPATIENT
Start: 2018-11-22 | End: 2018-11-23

## 2018-11-22 RX ORDER — LISINOPRIL 20 MG/1
20 TABLET ORAL
Status: DISCONTINUED | OUTPATIENT
Start: 2018-11-22 | End: 2018-11-23

## 2018-11-22 RX ORDER — HYDRALAZINE HYDROCHLORIDE 20 MG/ML
10 INJECTION INTRAMUSCULAR; INTRAVENOUS EVERY 6 HOURS PRN
Status: DISCONTINUED | OUTPATIENT
Start: 2018-11-22 | End: 2018-11-23

## 2018-11-22 NOTE — PROGRESS NOTES
[facility] - [facility]    Progress Note    Rose Mary Wlils Patient Status:  Inpatient    1945 MRN [id number]   Location [facility] 2W/SW Attending Albania Mays MD   Hosp Day # 1 PCP Olga Goins MD       Subjective:     Pt Vipin Quinones Rhythm -Left bundle branch block. ABNORMAL When compared with ECG of 07/25/2018 21:20:19 Heart rate has decreased Electronically signed on 11/21/2018 at 14:34 by Anthony Snyder MD      Assessment and Plan:     Hematochezia/symptomatic anemia.   Likely GI blo

## 2018-11-22 NOTE — PLAN OF CARE
HEMATOLOLGIC    • Maintain hematologic stability Progressing        Transfused 2 units of PRBC for hgb5. 8- Repeat  Hgb  -8.5.  No bloody stools noted, patient denies nausea, will continue to monitor

## 2018-11-22 NOTE — CONSULTS
[facility]    PATIENT'S NAME: Martin Pires   ATTENDING PHYSICIAN: Maurilio Martinez MD   CONSULTING PHYSICIAN: Ken Garland MD   PATIENT ACCOUNT#:   [id number]    LOCATION:  [address] [place] [address] RECORD #:   [id number]       MYLA cardiac cath earlier this year. Never had colonoscopy. MEDICATIONS:  Current medications include baby aspirin 1 per day, patient denies other NSAIDs; lisinopril 10 mg daily; carvedilol 6.5; furosemide 20; clopidogrel 75 daily; atorvastatin 20.       A bicarbonate 30, BUN 29, creatinine 1.07, glucose 166, calcium 9.4. Previous liver enzymes in October were normal.  PT 14.7 and INR 1.2. ASSESSMENT:    1. Gastrointestinal bleed.   Patient has acute gastrointestinal bleeding associated with nausea, s

## 2018-11-22 NOTE — PLAN OF CARE
ANEMIA OF PREMATURITY    • Hematocrit/Hemoblobin within normal range Progressing        HEMATOLOLGIC    • Maintain hematologic stability Progressing        Patient voiding freely. Up with supervision. Monitoring BP closely.  Plan for EGD and colonoscopy Fri

## 2018-11-22 NOTE — PROGRESS NOTES
[facility] HOSP - [facility]    Cardiology - [facility]  Progress Note    Ileana Pepperyal Patient Status:  Inpatient    1945 MRN [id number]   Location [facility] 2W/SW Attending Magalis Sarmiento MD   Hosp Day # 1 PCP Parisa Phan MD       As 11/22/2018    CO2 27 11/22/2018     (H) 11/22/2018    CA 8.4 (L) 11/22/2018    ALB 3.6 11/21/2018    ALKPHO 31 (L) 11/21/2018    BILT 0.7 11/21/2018    TP 6.3 11/21/2018    AST 23 11/21/2018    ALT 12 (L) 11/21/2018    PTT 27.2 11/21/2018    INR 1.2

## 2018-11-22 NOTE — PROGRESS NOTES
[facility]     Gastroenterology Progress Note    Domenic Geovanni Patient Status:  Inpatient    1945 MRN [id number]   [facility] 2W/SW Attending Javi Estrada MD   Hosp Day # 1 PCP Yovany Loco MD palpitations and leg swelling. Gastrointestinal: Negative for heartburn, nausea, vomiting, abdominal pain, diarrhea, constipation, blood in stool, abdominal distention, anal bleeding and rectal pain.    Endocrine: Negative for cold intolerance and heat in 60 19 99 %   11/21/18 1530 — — — 65 16 98 %   11/21/18 1500 154/47 97.3 °F (36.3 °C) — 61 15 99 %   11/21/18 1430 — — — 72 14 100 %   11/21/18 1400 155/52 97 °F (36.1 °C) Temporal 65 24 98 %   11/21/18 1345 (!) 163/39 97.3 °F (36.3 °C) Temporal 63 16 97 % hepatosplenomegaly. Musculoskeletal: Normal range of motion. She exhibits no tenderness. Lymphadenopathy:     She has no cervical adenopathy. Neurological: She is alert and oriented to person, place, and time. Skin: Skin is warm and dry.  No rash no Ekg 12-lead    Result Date: 11/21/2018  ECG Report  Interpretation  -------------------------- Sinus Rhythm -Left bundle branch block.  ABNORMAL When compared with ECG of 07/25/2018 21:20:19 Heart rate has decreased Electronically signed on 11/21/20

## 2018-11-23 ENCOUNTER — PRIOR ORIGINAL RECORDS (OUTPATIENT)
Dept: OTHER | Age: 73
End: 2018-11-23

## 2018-11-23 ENCOUNTER — ANESTHESIA (OUTPATIENT)
Dept: ENDOSCOPY | Facility: HOSPITAL | Age: 73
DRG: 378 | End: 2018-11-23
Payer: MEDICARE

## 2018-11-23 ENCOUNTER — ANESTHESIA EVENT (OUTPATIENT)
Dept: ENDOSCOPY | Facility: HOSPITAL | Age: 73
DRG: 378 | End: 2018-11-23
Payer: MEDICARE

## 2018-11-23 VITALS
HEIGHT: 57 IN | OXYGEN SATURATION: 97 % | WEIGHT: 117.19 LBS | BODY MASS INDEX: 25.28 KG/M2 | TEMPERATURE: 98 F | DIASTOLIC BLOOD PRESSURE: 48 MMHG | RESPIRATION RATE: 20 BRPM | HEART RATE: 61 BPM | SYSTOLIC BLOOD PRESSURE: 118 MMHG

## 2018-11-23 PROCEDURE — 0DJD8ZZ INSPECTION OF LOWER INTESTINAL TRACT, VIA NATURAL OR ARTIFICIAL OPENING ENDOSCOPIC: ICD-10-PCS | Performed by: INTERNAL MEDICINE

## 2018-11-23 PROCEDURE — 0DB68ZX EXCISION OF STOMACH, VIA NATURAL OR ARTIFICIAL OPENING ENDOSCOPIC, DIAGNOSTIC: ICD-10-PCS | Performed by: INTERNAL MEDICINE

## 2018-11-23 PROCEDURE — 45378 DIAGNOSTIC COLONOSCOPY: CPT | Performed by: INTERNAL MEDICINE

## 2018-11-23 PROCEDURE — 99239 HOSP IP/OBS DSCHRG MGMT >30: CPT | Performed by: HOSPITALIST

## 2018-11-23 PROCEDURE — 43239 EGD BIOPSY SINGLE/MULTIPLE: CPT | Performed by: INTERNAL MEDICINE

## 2018-11-23 RX ORDER — CLOPIDOGREL BISULFATE 75 MG/1
75 TABLET ORAL DAILY
Status: DISCONTINUED | OUTPATIENT
Start: 2018-11-23 | End: 2018-11-23

## 2018-11-23 RX ORDER — SODIUM CHLORIDE, SODIUM LACTATE, POTASSIUM CHLORIDE, CALCIUM CHLORIDE 600; 310; 30; 20 MG/100ML; MG/100ML; MG/100ML; MG/100ML
INJECTION, SOLUTION INTRAVENOUS CONTINUOUS PRN
Status: DISCONTINUED | OUTPATIENT
Start: 2018-11-23 | End: 2018-11-23

## 2018-11-23 RX ORDER — OMEPRAZOLE 40 MG/1
40 CAPSULE, DELAYED RELEASE ORAL DAILY
Qty: 30 CAPSULE | Refills: 1 | Status: SHIPPED | OUTPATIENT
Start: 2018-11-23 | End: 2018-11-23

## 2018-11-23 RX ORDER — AMOXICILLIN 500 MG/1
1000 CAPSULE ORAL 2 TIMES DAILY
Qty: 56 CAPSULE | Refills: 0 | Status: SHIPPED | OUTPATIENT
Start: 2018-11-23 | End: 2018-12-07

## 2018-11-23 RX ORDER — PANTOPRAZOLE SODIUM 20 MG/1
20 TABLET, DELAYED RELEASE ORAL
Status: DISCONTINUED | OUTPATIENT
Start: 2018-11-23 | End: 2018-11-23

## 2018-11-23 RX ORDER — CLARITHROMYCIN 500 MG/1
500 TABLET, COATED ORAL 2 TIMES DAILY
Qty: 28 TABLET | Refills: 0 | Status: SHIPPED | OUTPATIENT
Start: 2018-11-23 | End: 2018-12-07

## 2018-11-23 RX ORDER — ASPIRIN 81 MG/1
81 TABLET ORAL DAILY
Status: DISCONTINUED | OUTPATIENT
Start: 2018-11-24 | End: 2018-11-23

## 2018-11-23 RX ORDER — PHENYLEPHRINE HCL 10 MG/ML
VIAL (ML) INJECTION AS NEEDED
Status: DISCONTINUED | OUTPATIENT
Start: 2018-11-23 | End: 2018-11-23 | Stop reason: SURG

## 2018-11-23 RX ORDER — OMEPRAZOLE 40 MG/1
40 CAPSULE, DELAYED RELEASE ORAL 2 TIMES DAILY
Qty: 28 CAPSULE | Refills: 0 | Status: SHIPPED | OUTPATIENT
Start: 2018-11-23 | End: 2019-03-19 | Stop reason: ALTCHOICE

## 2018-11-23 RX ORDER — LIDOCAINE HYDROCHLORIDE 10 MG/ML
INJECTION, SOLUTION EPIDURAL; INFILTRATION; INTRACAUDAL; PERINEURAL AS NEEDED
Status: DISCONTINUED | OUTPATIENT
Start: 2018-11-23 | End: 2018-11-23 | Stop reason: SURG

## 2018-11-23 RX ADMIN — PHENYLEPHRINE HCL 100 MCG: 10 MG/ML VIAL (ML) INJECTION at 08:29:00

## 2018-11-23 RX ADMIN — SODIUM CHLORIDE: 9 INJECTION, SOLUTION INTRAVENOUS at 08:23:00

## 2018-11-23 RX ADMIN — LIDOCAINE HYDROCHLORIDE 50 MG: 10 INJECTION, SOLUTION EPIDURAL; INFILTRATION; INTRACAUDAL; PERINEURAL at 08:24:00

## 2018-11-23 RX ADMIN — SODIUM CHLORIDE: 9 INJECTION, SOLUTION INTRAVENOUS at 08:50:00

## 2018-11-23 NOTE — PLAN OF CARE
ANEMIA OF PREMATURITY    • Hematocrit/Hemoblobin within normal range Adequate for Discharge        CARDIOVASCULAR - ADULT    • Maintains optimal cardiac output and hemodynamic stability Adequate for Discharge        DISCHARGE PLANNING    • Discharge to carri

## 2018-11-23 NOTE — PROGRESS NOTES
11/21/18 - Two units PRBC transfused today. One was ordered under transfuse in ED. Transfuse second unit was ordered in inpatient unit.  Patient received 2 units PRBC  On 11/21/18

## 2018-11-23 NOTE — ANESTHESIA PREPROCEDURE EVALUATION
Anesthesia PreOp Note    HPI:     Albania De Los Santos is a 68year old female who presents for preoperative consultation requested by: Kisha Rich MD    Date of Surgery: 11/21/2018 - 11/23/2018    Procedure(s):  ESOPHAGOGASTRODUODENOSCOPY (EGD) times daily with meals. Disp: 60 tablet Rfl: 0 Taking   aspirin 81 MG Oral Tab EC Take 1 tablet (81 mg total) by mouth daily. Disp: 30 tablet Rfl: 0 Taking   atorvastatin 20 MG Oral Tab Take 1 tablet (20 mg total) by mouth nightly.  Disp: 30 tablet Rfl: 0 T Socioeconomic History      Marital status:        Spouse name: Not on file      Number of children: Not on file      Years of education: Not on file      Highest education level: Not on file    Social Needs      Financial resource strain: Not on file Anesthesia ROS/Med Hx and Physical Exam     Patient summary reviewed and Nursing notes reviewed    No history of anesthetic complications   Airway   Mallampati: II  TM distance: >3 FB  Neck ROM: full  Dental - normal exam     Pulmonary - negative RO benefits, risks including possible dental damage if relevant, major complications, and any alternative forms of anesthetic management. All of the patient's questions were answered to the best of my ability.  The patient desires the anesthetic management a

## 2018-11-23 NOTE — PLAN OF CARE
Janes Lin    • Maintain hematologic stability Progressing          Denies nausea, no bloody stools noted-Hgb stable, started on Golytely - scheduled for colonoscopy an EGD tomorrow- NPO after midnight

## 2018-11-23 NOTE — ANESTHESIA POSTPROCEDURE EVALUATION
Patient: Victor Hugo Reid    Procedure Summary     Date:  11/23/18 Room / Location:  [facility] ENDOSCOPY 01 / [facility] ENDOSCOPY    Anesthesia Start:  8960 Anesthesia Stop:      Procedures:       ESOPHAGOGASTRODUODENOSCOPY (EGD) (N/A )      COLONOSCOPY (N/A ) Denise Busch

## 2018-11-23 NOTE — OPERATIVE REPORT
[facility]    PATIENT'S NAME: August Mckayla   ATTENDING PHYSICIAN: Cristopher Lynn MD   OPERATING PHYSICIAN: Shelley Hyman MD   PATIENT ACCOUNT#:   [id number]    LOCATION:  [facility] ROOM 4 [facility] 10  MEDICAL RECORD #: report to follow.      Dictated By Deisy Grace MD  d: 11/23/2018 08:45:49  t: 11/23/2018 09:12:55  [facility] [id number]  ZPX/

## 2018-11-23 NOTE — H&P
History & Physical Examination    Patient Name: Caroline Andrews  MRN: [id number]  CSN: [id number]  YOB: 1945    Diagnosis: GI bleed      Medications Prior to Admission:  Omeprazole 40 MG Oral Capsule Delayed Release Take 1 capsule (40 mg tota Allergies    Past Medical History:   Diagnosis Date   • CAD (coronary artery disease)    • Coronary atherosclerosis    • Essential hypertension    • Heart attack ([facility] [address] 07/2018   • High blood pressure    • High cholesterol      Past Surgical History:   Proc

## 2018-11-23 NOTE — DISCHARGE SUMMARY
[facility] - [facility]    Discharge Summary    Steven Duque Patient Status:  Inpatient    1945 MRN [id number]   Location [facility] 2W/SW Attending No att. providers found   Hosp Day # 2 PCP Fiona Sweeney MD     Date of Admissio showed hemoglobin of 5.8. She had positive hematochezia and blood in her stool. GFR dropped to 48 from baseline. Patient had CT scan of the brain, which was negative. Patient has been complaining of headache and low energy.   Patient was started on IV P 12/7/2018  Quantity:  28 tablet  Refills:  0        CHANGE how you take these medications      Instructions Prescription details   lisinopril 10 MG Tabs  Commonly known as:  LIZ BULLOCK  What changed:  additional instructions      Take 1 tablet (10 mg [address] [facility]  [phone]             Mana Burrows MD In 1 month.     Specialty:  GASTROENTEROLOGY  Why:  Follow-up  Contact information:  [address] [place] [address] Discharge D

## 2018-11-23 NOTE — PROGRESS NOTES
[place] [facility] - [facility]    Progress Note    Haris Currieing Patient Status:  Inpatient    1945 MRN [id number]   Location [facility] 2W/SW Attending Catrachita Salcedo, [address] [facility] [address] Day # 2 PCP Danay Adhikari MD         Assessment and Plan: mg Oral 2 times daily   • atorvastatin  20 mg Oral Nightly   • carvedilol  6.25 mg Oral BID with meals             Results:     Lab Results   Component Value Date    WBC 7.4 11/23/2018    HGB 9.3 (L) 11/23/2018    HCT 27.3 (L) 11/23/2018     11/23/2

## 2018-11-23 NOTE — BRIEF OP NOTE
Pre-Operative Diagnosis: gi bleed      Post-Operative Diagnosis:  Diverticulosis, internal hemorrhoids; mild gastropathy     Procedure Performed:   Procedure(s):  esophagogastroduodenoscopy with biopsies and colonoscopy    Surgeon(s) and Role:     * Hugh DUCKWORTH

## 2018-11-23 NOTE — OPERATIVE REPORT
[facility]    PATIENT'S NAME: Briscoe Bernheim   ATTENDING PHYSICIAN: Joy Jimenez MD   OPERATING PHYSICIAN: Servando Garcia MD   PATIENT ACCOUNT#:   [id number]    LOCATION:  [facility] ROOM 4 [place] [address]  MEDICAL RECORD #: gastropathy. No signs of upper gastrointestinal bleeding or ulceration or recent bleeding. The most likely etiology of the patient's recent bleeding was diverticular. Now resolved with conservative management. RECOMMENDATIONS:    1.    Advance diet t

## 2018-11-26 ENCOUNTER — PATIENT OUTREACH (OUTPATIENT)
Dept: CASE MANAGEMENT | Age: 73
End: 2018-11-26

## 2018-11-26 ENCOUNTER — TELEPHONE (OUTPATIENT)
Dept: GASTROENTEROLOGY | Facility: CLINIC | Age: 73
End: 2018-11-26

## 2018-11-26 DIAGNOSIS — Z02.9 ENCOUNTERS FOR ADMINISTRATIVE PURPOSES: ICD-10-CM

## 2018-11-26 DIAGNOSIS — D64.9 ANEMIA, UNSPECIFIED TYPE: ICD-10-CM

## 2018-11-26 PROCEDURE — 1111F DSCHRG MED/CURRENT MED MERGE: CPT

## 2018-11-26 NOTE — PROGRESS NOTES
TCM OUTREACH    Call made to attempt to reach patient for TCM follow up. No answer, Voicemail left requesting call back at [phone].     Book by date: 12/7/18    (Triage Team if pt returns call please transfer to ext [phone])

## 2018-11-27 NOTE — PROGRESS NOTES
Initial Post Discharge Follow Up   Discharge Date: 11/23/18  Contact Date: 11/26/2018    Consent Verification:  Assessment Completed With: Daughter Jared sinha written consent   HIPAA Verified? Yes    Discharge Dx:   Hematochezia/symptomatic anemia.   Rodrigo Kenny (20 mg total) by mouth nightly. Disp: 30 tablet Rfl: 0   furosemide 20 MG Oral Tab Take 1 tablet (20 mg total) by mouth daily. Disp: 30 tablet Rfl: 0   Clopidogrel Bisulfate 75 MG Oral Tab Take 1 tablet (75 mg total) by mouth daily.  Disp: 30 tablet Rfl: 0 Reviewed common s/s of anemia to monitor for and report. Also reminded of CBC order for 11/30/18 at out pt lab. Dtr verbalized understanding . Reviewed  medications w Pt. Educated on importance of completing full course of abx.  Dtr states they have yet to

## 2018-11-29 ENCOUNTER — PRIOR ORIGINAL RECORDS (OUTPATIENT)
Dept: OTHER | Age: 73
End: 2018-11-29

## 2018-11-29 ENCOUNTER — MYAURORA ACCOUNT LINK (OUTPATIENT)
Dept: OTHER | Age: 73
End: 2018-11-29

## 2018-11-29 ENCOUNTER — TELEPHONE (OUTPATIENT)
Dept: GASTROENTEROLOGY | Facility: CLINIC | Age: 73
End: 2018-11-29

## 2018-11-29 NOTE — TELEPHONE ENCOUNTER
Daughter requesting an appt sooner than first available due to ICU follow up.  Please call thank you [phone]

## 2018-11-29 NOTE — TELEPHONE ENCOUNTER
Denies N/V fever, chills, loss of appetite, rectal bleeding, mucus mixed with stool, constipation, pain or diarrhea.     Future Appointments   Date Time Provider Keenan Marie   12/26/2018  4:15 PM Coleman Schmid MD [address]

## 2018-11-30 ENCOUNTER — PRIOR ORIGINAL RECORDS (OUTPATIENT)
Dept: OTHER | Age: 73
End: 2018-11-30

## 2018-11-30 ENCOUNTER — LAB ENCOUNTER (OUTPATIENT)
Dept: LAB | Age: 73
End: 2018-11-30
Attending: HOSPITALIST
Payer: MEDICARE

## 2018-11-30 DIAGNOSIS — D64.9 ANEMIA, UNSPECIFIED TYPE: ICD-10-CM

## 2018-11-30 DIAGNOSIS — K92.1 HEMATOCHEZIA: ICD-10-CM

## 2018-11-30 PROCEDURE — 85025 COMPLETE CBC W/AUTO DIFF WBC: CPT

## 2018-11-30 PROCEDURE — 36415 COLL VENOUS BLD VENIPUNCTURE: CPT

## 2018-12-03 ENCOUNTER — OFFICE VISIT (OUTPATIENT)
Dept: FAMILY MEDICINE CLINIC | Facility: CLINIC | Age: 73
End: 2018-12-03
Payer: MEDICARE

## 2018-12-03 VITALS
DIASTOLIC BLOOD PRESSURE: 68 MMHG | BODY MASS INDEX: 25 KG/M2 | WEIGHT: 117 LBS | SYSTOLIC BLOOD PRESSURE: 140 MMHG | TEMPERATURE: 98 F | HEART RATE: 62 BPM

## 2018-12-03 DIAGNOSIS — K27.9 PUD (PEPTIC ULCER DISEASE): Primary | ICD-10-CM

## 2018-12-03 DIAGNOSIS — K27.9 H PYLORI ULCER: ICD-10-CM

## 2018-12-03 DIAGNOSIS — B96.81 H PYLORI ULCER: ICD-10-CM

## 2018-12-03 DIAGNOSIS — I10 ESSENTIAL HYPERTENSION: ICD-10-CM

## 2018-12-03 DIAGNOSIS — D50.0 IRON DEFICIENCY ANEMIA DUE TO CHRONIC BLOOD LOSS: ICD-10-CM

## 2018-12-03 DIAGNOSIS — K92.2 GASTRIC BLEED: ICD-10-CM

## 2018-12-03 PROCEDURE — 99495 TRANSJ CARE MGMT MOD F2F 14D: CPT | Performed by: FAMILY MEDICINE

## 2018-12-03 RX ORDER — CYANOCOBALAMIN (VITAMIN B-12) 500 MCG
1 TABLET ORAL DAILY
Qty: 30 CAPSULE | Refills: 1 | Status: SHIPPED | OUTPATIENT
Start: 2018-12-03 | End: 2019-01-02

## 2018-12-03 RX ORDER — FERROUS SULFATE 325(65) MG
325 TABLET ORAL 2 TIMES DAILY
Qty: 60 TABLET | Refills: 1 | Status: SHIPPED | OUTPATIENT
Start: 2018-12-03 | End: 2019-03-19

## 2018-12-03 RX ORDER — CARVEDILOL 6.25 MG/1
6.25 TABLET ORAL 2 TIMES DAILY WITH MEALS
COMMUNITY
End: 2019-03-19

## 2018-12-03 NOTE — PROGRESS NOTES
12/3/2018  11:24 AM    Mason Caceres is a 68year old female.     Chief complaint(s): Patient presents with:  TCM (Transition Of Care Management)  Bite: pt states she was bit by something on left foot     HPI:     Mason Caceres primary complaint is rega Morbidity, and/or Mortality: moderate    Overall Risk:   moderate    Patient seen within 7 days from date of discharge. Prisca Cruz MD, 12/3/2018       With regard to the GERD, the location of her pain and discomfort is primarily epigastric.   Ruby mouth 2 (two) times daily with meals. Disp:  Rfl:    Ferrous Sulfate 325 (65 Fe) MG Oral Tab Take 1 tablet (325 mg total) by mouth 2 (two) times daily. Disp: 60 tablet Rfl: 1   Folic Acid 0.8 MG Oral Cap Take 1 capsule by mouth daily.  Disp: 30 capsule Rfl: Physical Exam    Constitutional: She appears well-developed and well-nourished. BP (!) 176/74 (BP Location: Right arm, Patient Position: Sitting, Cuff Size: adult)   Pulse 62   Temp 98 °F (36.7 °C) (Oral)   Wt 117 lb (53.1 kg)   Breastfeeding?  No   BMI TECHNIQUE: CT images were obtained without contrast material.  Automated exposure control for dose reduction was used.    FINDINGS:  CSF SPACES: The ventricles, cisterns, and sulci are dilated, but remain commensurate in caliber, consistent with parenchymal arteries. Minimal dental amalgam is seen on the  view. A few absent teeth are suggested. CONCLUSION:  1. No acute intracranial process by noncontrast CT technique.   2. Senescent changes of parenchymal volume loss with sequela of chronic microvas tablet, Rfl: 2  •  aspirin 81 MG Oral Tab EC, Take 1 tablet (81 mg total) by mouth daily. , Disp: 30 tablet, Rfl: 0  •  atorvastatin 20 MG Oral Tab, Take 1 tablet (20 mg total) by mouth nightly., Disp: 30 tablet, Rfl: 0  •  furosemide 20 MG Oral Tab, Take 1 Referrals:  None       Juan Aly MD

## 2018-12-03 NOTE — OPERATIVE REPORT
[facility]    PATIENT'S NAME: Lupillo Steve   ATTENDING PHYSICIAN: Bernabe Briones MD   OPERATING PHYSICIAN: Gama Strickland MD   PATIENT ACCOUNT#:   [de-identified]    LOCATION:  [address] [place] [address] #:   [id number]       MYLA pylorus was normal.    3.   The duodenum showed a normal bulb and sweep to the third portion. IMPRESSION:  Mild gastropathy. No signs of upper gastrointestinal bleeding, ulceration, or recent bleeding.   The most likely etiology of the patient's recen

## 2018-12-06 NOTE — H&P
Expand All Collapse All            Show:Clear all  [x]Manual[x]Template[]Copied    Added by:  [x]Keira Adams MD      []Yovana for details      [address] Note     Date:  7/26/2018  Date of Admission:  7/25/2018        Chief C Vision normal. Ear nose and throat normal. Bowel normal. Bladder function normal.  Question of mild depression. No thyroid disease. No rash. Muscles and joints unremarkable.  No weight loss no weight gain.     Physical Exam:   Vital Signs:  Blood pressure 1 7.  Will follow clinically in ICU.     2. Hypertension     Recommendations: Nitroglycerin, diuresis, await echo and will likely require afterload reduction with ACE inhibitor     3.   Hyperglycemia–suspect undiagnosed diabetes mellitus in patient with fami

## 2018-12-17 ENCOUNTER — OFFICE VISIT (OUTPATIENT)
Dept: FAMILY MEDICINE CLINIC | Facility: CLINIC | Age: 73
End: 2018-12-17
Payer: MEDICARE

## 2018-12-17 VITALS
TEMPERATURE: 98 F | DIASTOLIC BLOOD PRESSURE: 76 MMHG | BODY MASS INDEX: 25 KG/M2 | SYSTOLIC BLOOD PRESSURE: 195 MMHG | HEART RATE: 51 BPM | WEIGHT: 115 LBS

## 2018-12-17 DIAGNOSIS — D50.0 IRON DEFICIENCY ANEMIA DUE TO CHRONIC BLOOD LOSS: ICD-10-CM

## 2018-12-17 DIAGNOSIS — I10 ESSENTIAL HYPERTENSION: Primary | ICD-10-CM

## 2018-12-17 DIAGNOSIS — K27.9 PUD (PEPTIC ULCER DISEASE): ICD-10-CM

## 2018-12-17 LAB
BUN: 8 MG/DL
CALCIUM: 8.3 MG/DL
CHLORIDE: 109 MEQ/L
CREATININE, SERUM: 0.76 MG/DL
GLUCOSE: 106 MG/DL
HEMATOCRIT: 27 %
HEMOGLOBIN: 9.2 G/DL
PLATELETS: 319 K/UL
POTASSIUM, SERUM: 3.3 MEQ/L
RED BLOOD COUNT: 3 X 10-6/U
SODIUM: 139 MEQ/L
WHITE BLOOD COUNT: 4.4 X 10-3/U

## 2018-12-17 PROCEDURE — 85018 HEMOGLOBIN: CPT | Performed by: FAMILY MEDICINE

## 2018-12-17 PROCEDURE — 36416 COLLJ CAPILLARY BLOOD SPEC: CPT | Performed by: FAMILY MEDICINE

## 2018-12-17 PROCEDURE — 99214 OFFICE O/P EST MOD 30 MIN: CPT | Performed by: FAMILY MEDICINE

## 2018-12-17 PROCEDURE — G0463 HOSPITAL OUTPT CLINIC VISIT: HCPCS | Performed by: FAMILY MEDICINE

## 2018-12-17 RX ORDER — LISINOPRIL 30 MG/1
30 TABLET ORAL DAILY
Qty: 30 TABLET | Refills: 1 | Status: SHIPPED | OUTPATIENT
Start: 2018-12-17 | End: 2019-02-17

## 2018-12-17 RX ORDER — CARVEDILOL 6.25 MG/1
6.25 TABLET ORAL 2 TIMES DAILY WITH MEALS
COMMUNITY
End: 2021-01-20

## 2018-12-17 NOTE — PROGRESS NOTES
12/17/2018  2:21 PM    Adam Marino is a 68year old female. Chief complaint(s): Patient presents with: Follow - Up  Anemia    HPI:     Adam Marino primary complaint is regarding PUD/ H pylori and anemia.      With regard to the GERD, the locatio visit):    Current Outpatient Medications:  carvedilol 6.25 MG Oral Tab Take 6.25 mg by mouth 2 (two) times daily with meals. Disp:  Rfl:    lisinopril 30 MG Oral Tab Take 1 tablet (30 mg total) by mouth daily.  Disp: 30 tablet Rfl: 1   carvedilol 6.25 MG O Mouth/Throat: Oropharynx is clear and moist.   Eyes: Conjunctivae are normal.   Neck: Neck supple. Cardiovascular: Normal rate and regular rhythm. Pulmonary/Chest: Effort normal and breath sounds normal. She has no wheezes. She has no rales.    Abdom total) by mouth daily. , Disp: 30 tablet, Rfl: 1  •  carvedilol 6.25 MG Oral Tab, Take 6.25 mg by mouth 2 (two) times daily with meals. , Disp: , Rfl:   •  Ferrous Sulfate 325 (65 Fe) MG Oral Tab, Take 1 tablet (325 mg total) by mouth 2 (two) times daily. , D

## 2018-12-19 ENCOUNTER — PRIOR ORIGINAL RECORDS (OUTPATIENT)
Dept: OTHER | Age: 73
End: 2018-12-19

## 2018-12-19 ENCOUNTER — MYAURORA ACCOUNT LINK (OUTPATIENT)
Dept: OTHER | Age: 73
End: 2018-12-19

## 2018-12-26 ENCOUNTER — OFFICE VISIT (OUTPATIENT)
Dept: GASTROENTEROLOGY | Facility: CLINIC | Age: 73
End: 2018-12-26
Payer: MEDICARE

## 2018-12-26 VITALS
SYSTOLIC BLOOD PRESSURE: 178 MMHG | WEIGHT: 115.81 LBS | DIASTOLIC BLOOD PRESSURE: 75 MMHG | HEART RATE: 55 BPM | HEIGHT: 59 IN | BODY MASS INDEX: 23.35 KG/M2

## 2018-12-26 DIAGNOSIS — K92.2 GASTROINTESTINAL HEMORRHAGE, UNSPECIFIED GASTROINTESTINAL HEMORRHAGE TYPE: Primary | ICD-10-CM

## 2018-12-26 DIAGNOSIS — K64.8 INTERNAL HEMORRHOIDS WITHOUT COMPLICATION: ICD-10-CM

## 2018-12-26 DIAGNOSIS — K57.30 DIVERTICULOSIS LARGE INTESTINE W/O PERFORATION OR ABSCESS W/O BLEEDING: ICD-10-CM

## 2018-12-26 DIAGNOSIS — K31.9 GASTROPATHY: ICD-10-CM

## 2018-12-26 DIAGNOSIS — D64.9 ANEMIA, UNSPECIFIED TYPE: ICD-10-CM

## 2018-12-26 PROCEDURE — 99214 OFFICE O/P EST MOD 30 MIN: CPT | Performed by: INTERNAL MEDICINE

## 2018-12-26 RX ORDER — PANTOPRAZOLE SODIUM 20 MG/1
20 TABLET, DELAYED RELEASE ORAL
Qty: 90 TABLET | Refills: 3 | Status: SHIPPED | OUTPATIENT
Start: 2018-12-26 | End: 2019-03-19

## 2018-12-26 NOTE — PROGRESS NOTES
HPI:    Patient ID: Ileana Haddad is a 68year old female. Gastroenterology follow-up visit:    History of present illness: This is a 25-year-old female who was recently hospitalized with a GI bleed.   She is here in routine follow-up, referred by ROSHNI to moderate chronic inactive gastritis. · No dysplasia or metaplasia is identified.   · Diff-Quik stain (with appropriate control reactivity), is negative for H. pylori microorganisms.      Electronically signed by Alex Gallagher"      Her most recent CBC showed Plavix. Patient has high blood pressure and has been referred back to her primary care provider. She is asymptomatic for cardiac symptomatology presently. 4.  GERD:  As above, will remain on PPI, currently pantoprazole.             Review of Systems Acid 0.8 MG Oral Cap Take 1 capsule by mouth daily. Disp: 30 capsule Rfl: 1   atorvastatin 20 MG Oral Tab Take 1 tablet (20 mg total) by mouth nightly. Disp: 30 tablet Rfl: 0   furosemide 20 MG Oral Tab Take 1 tablet (20 mg total) by mouth daily.  Disp: 30 time.   Skin: Skin is warm and dry. No rash noted. She is not diaphoretic. No erythema. No pallor. Psychiatric: She has a normal mood and affect. Her behavior is normal. Judgment and thought content normal.   Nursing note and vitals reviewed.

## 2018-12-26 NOTE — PATIENT INSTRUCTIONS
1.  Repeat CBC today. Iron, ferritin and TIBC with B12 level should also be checked. 2.  Follow-up with primary care provider regarding blood pressure    3. Avoid caffeine, precipitating foods, spicy foods, citrus    4.   Change omeprazole to pantopraz

## 2018-12-27 ENCOUNTER — LAB ENCOUNTER (OUTPATIENT)
Dept: LAB | Age: 73
End: 2018-12-27
Attending: CLINICAL NURSE SPECIALIST
Payer: MEDICARE

## 2018-12-27 ENCOUNTER — PRIOR ORIGINAL RECORDS (OUTPATIENT)
Dept: OTHER | Age: 73
End: 2018-12-27

## 2018-12-27 DIAGNOSIS — I50.23 ACUTE ON CHRONIC SYSTOLIC HEART FAILURE (HCC): ICD-10-CM

## 2018-12-27 DIAGNOSIS — D64.9 ANEMIA, UNSPECIFIED TYPE: ICD-10-CM

## 2018-12-27 DIAGNOSIS — K92.2 GASTROINTESTINAL HEMORRHAGE, UNSPECIFIED GASTROINTESTINAL HEMORRHAGE TYPE: ICD-10-CM

## 2018-12-27 PROCEDURE — 36415 COLL VENOUS BLD VENIPUNCTURE: CPT

## 2018-12-27 PROCEDURE — 80048 BASIC METABOLIC PNL TOTAL CA: CPT

## 2018-12-27 PROCEDURE — 83540 ASSAY OF IRON: CPT

## 2018-12-27 PROCEDURE — 84466 ASSAY OF TRANSFERRIN: CPT

## 2018-12-27 PROCEDURE — 85025 COMPLETE CBC W/AUTO DIFF WBC: CPT

## 2018-12-27 PROCEDURE — 82607 VITAMIN B-12: CPT

## 2018-12-27 PROCEDURE — 82728 ASSAY OF FERRITIN: CPT

## 2019-01-03 ENCOUNTER — TELEPHONE (OUTPATIENT)
Dept: GASTROENTEROLOGY | Facility: CLINIC | Age: 74
End: 2019-01-03

## 2019-01-07 ENCOUNTER — OFFICE VISIT (OUTPATIENT)
Dept: FAMILY MEDICINE CLINIC | Facility: CLINIC | Age: 74
End: 2019-01-07
Payer: MEDICARE

## 2019-01-07 VITALS
BODY MASS INDEX: 23.11 KG/M2 | HEART RATE: 52 BPM | WEIGHT: 114.63 LBS | HEIGHT: 59 IN | DIASTOLIC BLOOD PRESSURE: 74 MMHG | TEMPERATURE: 99 F | SYSTOLIC BLOOD PRESSURE: 187 MMHG

## 2019-01-07 DIAGNOSIS — I10 ESSENTIAL HYPERTENSION: Primary | ICD-10-CM

## 2019-01-07 DIAGNOSIS — K92.2 GASTRIC BLEED: ICD-10-CM

## 2019-01-07 DIAGNOSIS — K27.9 PUD (PEPTIC ULCER DISEASE): ICD-10-CM

## 2019-01-07 PROCEDURE — 99213 OFFICE O/P EST LOW 20 MIN: CPT | Performed by: FAMILY MEDICINE

## 2019-01-07 PROCEDURE — G0463 HOSPITAL OUTPT CLINIC VISIT: HCPCS | Performed by: FAMILY MEDICINE

## 2019-01-07 RX ORDER — MAGNESIUM OXIDE 400 MG (241.3 MG MAGNESIUM) TABLET
800 TABLET DAILY
COMMUNITY

## 2019-01-07 NOTE — PROGRESS NOTES
1/7/2019  3:20 PM    Allison Ritchie is a 68year old female. Chief complaint(s): Patient presents with: Follow - Up    HPI:     Allison Ritchie primary complaint is regarding multiple complaints.      Michelle Charleen a 68year old female presents wit ESOPHAGOGASTRODUODENOSCOPY (EGD) N/A 11/23/2018    Performed by Jeri Dodson MD at [facility] ENDOSCOPY      No family history on file.    Social History: Social History    Tobacco Use      Smoking status: Never Smoker      Smokeless tobacco: Never Gastrointestinal: Negative for nausea, vomiting, abdominal pain, diarrhea and constipation. Musculoskeletal: Negative for back pain. Neurological: Negative for seizures and headaches. Psychiatric/Behavioral: Negative for depressed mood.      PHYSICA Value Ref Range    Vitamin B12 295 181 - 914 pg/mL   CBC W/ DIFFERENTIAL   Result Value Ref Range    WBC 3.3 (L) 4.0 - 11.0 K/UL    RBC 3.43 (L) 3.70 - 5.40 M/UL    HGB 10.5 (L) 12.0 - 16.0 g/dL    HCT 31.6 (L) 35.0 - 48.0 %    MCV 92.1 80.0 - 100.0 fL encounter.       Meds This Visit:  Requested Prescriptions      No prescriptions requested or ordered in this encounter       Imaging & Referrals:  None         Kristyn Shipman MD

## 2019-02-06 LAB
BUN: 16 MG/DL
CALCIUM: 9.1 MG/DL
CHLORIDE: 104 MEQ/L
CREATININE, SERUM: 1 MG/DL
GLUCOSE: 177 MG/DL
HEMATOCRIT: 31.6 %
HEMOGLOBIN: 10.5 G/DL
PLATELETS: 242 K/UL
POTASSIUM, SERUM: 4.2 MEQ/L
RED BLOOD COUNT: 3.43 X 10-6/U
SODIUM: 139 MEQ/L
WHITE BLOOD COUNT: 3.3 X 10-3/U

## 2019-02-07 ENCOUNTER — PRIOR ORIGINAL RECORDS (OUTPATIENT)
Dept: OTHER | Age: 74
End: 2019-02-07

## 2019-02-18 RX ORDER — LISINOPRIL 30 MG/1
TABLET ORAL
Qty: 30 TABLET | Refills: 1 | Status: SHIPPED | OUTPATIENT
Start: 2019-02-18 | End: 2019-03-19 | Stop reason: ALTCHOICE

## 2019-02-28 VITALS
DIASTOLIC BLOOD PRESSURE: 70 MMHG | WEIGHT: 128 LBS | SYSTOLIC BLOOD PRESSURE: 160 MMHG | BODY MASS INDEX: 29.62 KG/M2 | HEIGHT: 55 IN | HEART RATE: 64 BPM | OXYGEN SATURATION: 97 %

## 2019-02-28 VITALS
BODY MASS INDEX: 27.77 KG/M2 | SYSTOLIC BLOOD PRESSURE: 180 MMHG | HEART RATE: 60 BPM | HEIGHT: 55 IN | WEIGHT: 120 LBS | DIASTOLIC BLOOD PRESSURE: 80 MMHG

## 2019-02-28 VITALS
HEART RATE: 66 BPM | BODY MASS INDEX: 27.08 KG/M2 | HEIGHT: 55 IN | DIASTOLIC BLOOD PRESSURE: 60 MMHG | WEIGHT: 117 LBS | SYSTOLIC BLOOD PRESSURE: 170 MMHG

## 2019-02-28 VITALS
HEART RATE: 58 BPM | HEIGHT: 60 IN | BODY MASS INDEX: 21.6 KG/M2 | SYSTOLIC BLOOD PRESSURE: 170 MMHG | DIASTOLIC BLOOD PRESSURE: 70 MMHG | WEIGHT: 110 LBS

## 2019-02-28 VITALS
DIASTOLIC BLOOD PRESSURE: 70 MMHG | RESPIRATION RATE: 18 BRPM | BODY MASS INDEX: 22.97 KG/M2 | HEIGHT: 60 IN | SYSTOLIC BLOOD PRESSURE: 130 MMHG | WEIGHT: 117 LBS | HEART RATE: 62 BPM

## 2019-02-28 VITALS
HEART RATE: 60 BPM | WEIGHT: 124 LBS | HEIGHT: 55 IN | DIASTOLIC BLOOD PRESSURE: 60 MMHG | SYSTOLIC BLOOD PRESSURE: 180 MMHG | BODY MASS INDEX: 28.7 KG/M2

## 2019-02-28 VITALS
DIASTOLIC BLOOD PRESSURE: 64 MMHG | HEIGHT: 60 IN | HEART RATE: 56 BPM | WEIGHT: 115 LBS | SYSTOLIC BLOOD PRESSURE: 170 MMHG | BODY MASS INDEX: 22.58 KG/M2 | OXYGEN SATURATION: 99 %

## 2019-02-28 VITALS
HEART RATE: 66 BPM | SYSTOLIC BLOOD PRESSURE: 130 MMHG | DIASTOLIC BLOOD PRESSURE: 60 MMHG | BODY MASS INDEX: 29.16 KG/M2 | HEIGHT: 55 IN | WEIGHT: 126 LBS

## 2019-03-11 RX ORDER — CARVEDILOL 6.25 MG/1
TABLET ORAL
Qty: 60 TABLET | Refills: 5 | Status: SHIPPED | OUTPATIENT
Start: 2019-03-11 | End: 2019-09-08 | Stop reason: SDUPTHER

## 2019-03-18 RX ORDER — FUROSEMIDE 20 MG/1
TABLET ORAL
Qty: 30 TABLET | Refills: 5 | Status: SHIPPED | OUTPATIENT
Start: 2019-03-18 | End: 2019-09-22 | Stop reason: SDUPTHER

## 2019-03-18 RX ORDER — CLOPIDOGREL BISULFATE 75 MG/1
TABLET ORAL
Qty: 30 TABLET | Refills: 5 | Status: SHIPPED | OUTPATIENT
Start: 2019-03-18 | End: 2019-09-22 | Stop reason: SDUPTHER

## 2019-03-19 ENCOUNTER — TELEPHONE (OUTPATIENT)
Dept: GASTROENTEROLOGY | Facility: CLINIC | Age: 74
End: 2019-03-19

## 2019-03-19 ENCOUNTER — TELEPHONE (OUTPATIENT)
Dept: FAMILY MEDICINE CLINIC | Facility: CLINIC | Age: 74
End: 2019-03-19

## 2019-03-19 ENCOUNTER — OFFICE VISIT (OUTPATIENT)
Dept: FAMILY MEDICINE CLINIC | Facility: CLINIC | Age: 74
End: 2019-03-19
Payer: MEDICARE

## 2019-03-19 VITALS
WEIGHT: 108 LBS | TEMPERATURE: 98 F | SYSTOLIC BLOOD PRESSURE: 183 MMHG | BODY MASS INDEX: 21.77 KG/M2 | HEIGHT: 59 IN | DIASTOLIC BLOOD PRESSURE: 76 MMHG | HEART RATE: 65 BPM

## 2019-03-19 DIAGNOSIS — K27.9 PUD (PEPTIC ULCER DISEASE): Primary | ICD-10-CM

## 2019-03-19 DIAGNOSIS — I10 ESSENTIAL HYPERTENSION: ICD-10-CM

## 2019-03-19 LAB
CUVETTE LOT #: ABNORMAL NUMERIC
HEMOGLOBIN: 11.4 G/DL (ref 12–15)

## 2019-03-19 PROCEDURE — G0463 HOSPITAL OUTPT CLINIC VISIT: HCPCS | Performed by: FAMILY MEDICINE

## 2019-03-19 PROCEDURE — 36416 COLLJ CAPILLARY BLOOD SPEC: CPT | Performed by: FAMILY MEDICINE

## 2019-03-19 PROCEDURE — 99213 OFFICE O/P EST LOW 20 MIN: CPT | Performed by: FAMILY MEDICINE

## 2019-03-19 PROCEDURE — 85018 HEMOGLOBIN: CPT | Performed by: FAMILY MEDICINE

## 2019-03-19 RX ORDER — FERROUS SULFATE 325(65) MG
325 TABLET ORAL 2 TIMES DAILY
Qty: 60 TABLET | Refills: 1 | Status: SHIPPED | OUTPATIENT
Start: 2019-03-19 | End: 2019-05-11

## 2019-03-19 RX ORDER — OMEPRAZOLE 40 MG/1
40 CAPSULE, DELAYED RELEASE ORAL DAILY
Qty: 90 CAPSULE | Refills: 2 | OUTPATIENT
Start: 2019-03-19 | End: 2019-05-01

## 2019-03-19 NOTE — TELEPHONE ENCOUNTER
Pts daughter/Carmel calling for update on last labs results and to update office that pt is still bleeding/spotting, pls call at:[phone],thanks.

## 2019-03-19 NOTE — TELEPHONE ENCOUNTER
Dr Rosalva James, Patient's daughter, Cristopher Christianson, calling to explain there was an error when updating the profile at today's visit, please see pending rx for lisinopril, new dose, do you want the patient to change to omeprazole and discontinue pantoprazole?     The

## 2019-03-19 NOTE — PROGRESS NOTES
3/19/2019  9:05 AM    [address] [place] [address] is a 68year old female. Chief complaint(s): Patient presents with:  Ulcer: ulcer in abd and is still having bloody stools    HPI:     [address] [place] [address] primary complaint is regarding PUD.      With regard to the GERD, Haley Nunn MD at [facility] ENDOSCOPY   • ESOPHAGOGASTRODUODENOSCOPY (EGD) N/A 11/23/2018    Performed by Greer Hicks MD at [address] [place] [address]. No pertinent family history.    Social History: Social History    Tobacco U Musculoskeletal: Negative for back pain. Neurological: Negative for seizures and headaches. Psychiatric/Behavioral: Negative for depressed mood. PHYSICAL EXAM:   Physical Exam    Constitutional: She appears well-developed and well-nourished. hypertension   HTN     MEDICATIONS:   Requested Prescriptions     Signed Prescriptions Disp Refills   • Omeprazole 40 MG Oral Capsule Delayed Release 90 capsule 2     Sig: Take 1 capsule (40 mg total) by mouth daily.    • Ferrous Sulfate 325 (65 Fe) MG Oral

## 2019-03-19 NOTE — TELEPHONE ENCOUNTER
Dr Rossy Veronica,    Please see OV with Dr Susan Chen from today. Daughter is wondering if her mother needs to be scoped again    Last EGD/Colon was 11/21/18      Final Diagnosis:      A.  Gastric antrum; biopsy:   · Gastric antral mucosa with mild to moderate

## 2019-03-22 RX ORDER — PANTOPRAZOLE SODIUM 20 MG/1
20 TABLET, DELAYED RELEASE ORAL
Qty: 90 TABLET | Refills: 3 | Status: CANCELLED | OUTPATIENT
Start: 2019-03-22

## 2019-03-22 RX ORDER — LISINOPRIL 40 MG/1
40 TABLET ORAL DAILY
Qty: 90 TABLET | Refills: 0 | Status: CANCELLED | OUTPATIENT
Start: 2019-03-22

## 2019-03-22 NOTE — TELEPHONE ENCOUNTER
Dr. Kim Dallas,    Patient is taking lisinopril 30 mg and 10 mg, both QD. Would you like to switch patient to lisinopril 40 mg once a day ? Also, patient is taking pantoprazole 20 mg QD, not omeprazole 40 mg.  Would you like to switch her to this medicatio

## 2019-03-22 NOTE — TELEPHONE ENCOUNTER
Patient is taking pantoprazole 20 mg QD, not omeprazole 40 mg. Would you like the patient to stop taking her pantoprazole 20 and take omeprazole 40 mg instead?

## 2019-03-25 RX ORDER — OMEPRAZOLE 40 MG/1
CAPSULE, DELAYED RELEASE ORAL
Qty: 30 CAPSULE | Refills: 2 | Status: SHIPPED | OUTPATIENT
Start: 2019-03-25 | End: 2019-05-01

## 2019-03-25 RX ORDER — LISINOPRIL 30 MG/1
30 TABLET ORAL
Qty: 30 TABLET | Refills: 2 | Status: SHIPPED | OUTPATIENT
Start: 2019-03-25 | End: 2019-07-15

## 2019-03-25 NOTE — TELEPHONE ENCOUNTER
Pharmacy contacted, they did not receive order for omeprazole 40mg on 3/19/19 from the office visit, verbal rx to [address].  erx Lisinopril 30mg sent per Dr Sedrick Waller order below 3/22/19

## 2019-04-01 ENCOUNTER — PATIENT MESSAGE (OUTPATIENT)
Dept: FAMILY MEDICINE CLINIC | Facility: CLINIC | Age: 74
End: 2019-04-01

## 2019-04-01 ENCOUNTER — PATIENT MESSAGE (OUTPATIENT)
Dept: OTHER | Age: 74
End: 2019-04-01

## 2019-04-01 NOTE — TELEPHONE ENCOUNTER
From: Angelique Buenrostro  To: Angelique Buenrotsro MD  Sent: 4/1/2019 11:58 AM CDT  Subject: Prescription Question    I had spoken with the nurse to advise that my mom is on pantroprazole it wasn't corrected. I need a refill#[id number].

## 2019-04-02 RX ORDER — PANTOPRAZOLE SODIUM 20 MG/1
20 TABLET, DELAYED RELEASE ORAL
Qty: 90 TABLET | Refills: 3 | Status: SHIPPED | OUTPATIENT
Start: 2019-04-02 | End: 2020-03-30

## 2019-04-02 NOTE — TELEPHONE ENCOUNTER
From: Slade Thompson  Sent: 4/1/2019 9:06 PM CDT  To: Em Rn Triage  Subject: RE:(No subject)    ----- Message from Swapdomt Generic sent at 4/1/2019 9:06 PM CDT -----    Yes, please.   ----- Message -----  From: Syed Dsouza MD  Sent: 4/1/2019 9:02 PM

## 2019-04-05 NOTE — TELEPHONE ENCOUNTER
Future Appointments   Date Time Provider Keenan Marie   5/1/2019  3:40 PM Mable Napier MD [facility]   6/18/2019 10:45 AM [facility]

## 2019-04-10 RX ORDER — LISINOPRIL 10 MG/1
TABLET ORAL
COMMUNITY
End: 2019-07-03 | Stop reason: SDUPTHER

## 2019-04-10 RX ORDER — ATORVASTATIN CALCIUM 40 MG/1
TABLET, FILM COATED ORAL
COMMUNITY
End: 2020-08-04

## 2019-04-10 RX ORDER — UREA 10 %
LOTION (ML) TOPICAL
COMMUNITY

## 2019-04-10 RX ORDER — LISINOPRIL 40 MG/1
TABLET ORAL
COMMUNITY

## 2019-04-10 RX ORDER — OMEPRAZOLE 40 MG/1
CAPSULE, DELAYED RELEASE ORAL
COMMUNITY
End: 2019-08-12

## 2019-04-10 RX ORDER — PNV NO.95/FERROUS FUM/FOLIC AC 28MG-0.8MG
TABLET ORAL
COMMUNITY
End: 2020-08-20

## 2019-05-01 PROBLEM — K64.8 INTERNAL HEMORRHOID, BLEEDING: Status: ACTIVE | Noted: 2019-05-01

## 2019-05-01 PROBLEM — B96.81 HELICOBACTER PYLORI GASTRITIS: Status: ACTIVE | Noted: 2019-05-01

## 2019-05-01 PROBLEM — K29.70 HELICOBACTER PYLORI GASTRITIS: Status: ACTIVE | Noted: 2019-05-01

## 2019-05-11 RX ORDER — PNV NO.95/FERROUS FUM/FOLIC AC 28MG-0.8MG
TABLET ORAL
Qty: 180 TABLET | Refills: 1 | Status: SHIPPED | OUTPATIENT
Start: 2019-05-11 | End: 2019-11-03

## 2019-05-11 NOTE — TELEPHONE ENCOUNTER
Review pended refill request as it does not fall under a protocol.     Last Rx: 3/19/19  LOV: 1 week ago for internal hemorrhoid bleeding

## 2019-06-18 ENCOUNTER — OFFICE VISIT (OUTPATIENT)
Dept: FAMILY MEDICINE CLINIC | Facility: CLINIC | Age: 74
End: 2019-06-18
Payer: MEDICARE

## 2019-06-18 VITALS
DIASTOLIC BLOOD PRESSURE: 86 MMHG | HEIGHT: 59 IN | WEIGHT: 110.19 LBS | BODY MASS INDEX: 22.21 KG/M2 | SYSTOLIC BLOOD PRESSURE: 142 MMHG | TEMPERATURE: 99 F | HEART RATE: 50 BPM

## 2019-06-18 DIAGNOSIS — D50.0 IRON DEFICIENCY ANEMIA DUE TO CHRONIC BLOOD LOSS: ICD-10-CM

## 2019-06-18 DIAGNOSIS — I10 ESSENTIAL HYPERTENSION: ICD-10-CM

## 2019-06-18 DIAGNOSIS — K27.9 PUD (PEPTIC ULCER DISEASE): Primary | ICD-10-CM

## 2019-06-18 PROCEDURE — 99213 OFFICE O/P EST LOW 20 MIN: CPT | Performed by: FAMILY MEDICINE

## 2019-06-18 PROCEDURE — 36416 COLLJ CAPILLARY BLOOD SPEC: CPT | Performed by: FAMILY MEDICINE

## 2019-06-18 PROCEDURE — G0463 HOSPITAL OUTPT CLINIC VISIT: HCPCS | Performed by: FAMILY MEDICINE

## 2019-06-18 PROCEDURE — 85018 HEMOGLOBIN: CPT | Performed by: FAMILY MEDICINE

## 2019-06-18 RX ORDER — LISINOPRIL 10 MG/1
TABLET ORAL
COMMUNITY
End: 2019-06-18

## 2019-06-18 NOTE — PROGRESS NOTES
6/18/2019  11:03 AM    Alma Delia Garrett is a 69year old female. Chief complaint(s): Patient presents with:  HTN  Peptic Ulcer Disease    HPI:     Alma Delia Garrett primary complaint is regarding as above.      With regard to the GERD, the location of her p on file.    Social History: Social History    Tobacco Use      Smoking status: Never Smoker      Smokeless tobacco: Never Used    Alcohol use: No    Drug use: No       Immunizations:     Immunization History  Administered            Date(s) Administered HENT:   Head: Normocephalic. Mouth/Throat: Oropharynx is clear and moist.   Eyes: Conjunctivae are normal.   Neck: Neck supple. Cardiovascular: Normal rate. Pulmonary/Chest: Effort normal.   Lymphadenopathy:     She has no cervical adenopathy.    Kermit Prophet Hemoglobin [98883]      Meds This Visit:  Requested Prescriptions      No prescriptions requested or ordered in this encounter       Imaging & Referrals:  None         Syed Dsouza MD

## 2019-07-03 RX ORDER — LISINOPRIL 10 MG/1
TABLET ORAL
Qty: 90 TABLET | Refills: 0 | Status: SHIPPED | OUTPATIENT
Start: 2019-07-03 | End: 2020-08-20 | Stop reason: SDUPTHER

## 2019-07-13 ENCOUNTER — PATIENT MESSAGE (OUTPATIENT)
Dept: FAMILY MEDICINE CLINIC | Facility: CLINIC | Age: 74
End: 2019-07-13

## 2019-07-15 RX ORDER — LISINOPRIL 30 MG/1
30 TABLET ORAL
Qty: 90 TABLET | Refills: 1 | Status: SHIPPED | OUTPATIENT
Start: 2019-07-15 | End: 2019-10-16

## 2019-07-15 NOTE — TELEPHONE ENCOUNTER
From: Alecia Mancilla  To: Alecia Mancilla MD  Sent: 7/13/2019 4:36 PM CDT  Subject: Prescription Question    Can you please send a prescription for 40mg lisinopril to [facility]   Thank you

## 2019-07-15 NOTE — TELEPHONE ENCOUNTER
Hypertensive Medications  Protocol Criteria:  · Appointment scheduled in the past 6 months or in the next 3 months  · BMP or CMP in the past 12 months  · Creatinine result < 2  Recent Outpatient Visits            3 weeks ago PUD (peptic ulcer disease)    E

## 2019-07-22 ENCOUNTER — TELEPHONE (OUTPATIENT)
Dept: OTHER | Age: 74
End: 2019-07-22

## 2019-07-22 RX ORDER — LISINOPRIL 40 MG/1
40 TABLET ORAL DAILY
Qty: 90 TABLET | Refills: 0 | Status: SHIPPED | OUTPATIENT
Start: 2019-07-22 | End: 2020-01-14

## 2019-07-22 RX ORDER — LISINOPRIL 10 MG/1
10 TABLET ORAL DAILY
Qty: 30 TABLET | Refills: 0 | Status: SHIPPED | OUTPATIENT
Start: 2019-07-22 | End: 2019-08-22

## 2019-07-22 RX ORDER — LISINOPRIL 30 MG/1
30 TABLET ORAL
Qty: 90 TABLET | Refills: 1 | OUTPATIENT
Start: 2019-07-22

## 2019-07-22 NOTE — TELEPHONE ENCOUNTER
Pt daughter called and states prior to 12/17/19 pt had been taking Lisinopril 10 mg BID and during 12/17/19 OV Dr Adriel Haynes had told pt he was increasing dosage to Lisinopril 40 mg.  Per daughter MD told pt he ordered Lisinopril 30 mg and pt could use the e

## 2019-08-08 PROBLEM — K92.1 HEMATOCHEZIA: Status: ACTIVE | Noted: 2019-08-08

## 2019-08-08 PROBLEM — D64.9 ANEMIA: Status: ACTIVE | Noted: 2019-08-08

## 2019-08-08 PROBLEM — I16.1 HYPERTENSIVE EMERGENCY: Status: ACTIVE | Noted: 2019-08-08

## 2019-08-08 PROBLEM — I50.23 ACUTE ON CHRONIC SYSTOLIC CHF (CONGESTIVE HEART FAILURE) (CMD): Status: ACTIVE | Noted: 2019-08-08

## 2019-08-08 PROBLEM — I21.4 NSTEMI (NON-ST ELEVATION MYOCARDIAL INFARCTION) (CMD): Status: ACTIVE | Noted: 2019-08-08

## 2019-08-08 PROBLEM — E87.6 HYPOKALEMIA: Status: ACTIVE | Noted: 2019-08-08

## 2019-08-08 PROBLEM — R73.9 HYPERGLYCEMIA: Status: ACTIVE | Noted: 2019-08-08

## 2019-08-08 RX ORDER — PANTOPRAZOLE SODIUM 20 MG/1
20 TABLET, DELAYED RELEASE ORAL DAILY
COMMUNITY
Start: 2019-04-02

## 2019-08-08 RX ORDER — ASPIRIN 81 MG/1
81 TABLET ORAL DAILY
COMMUNITY
Start: 2018-08-16 | End: 2019-08-12

## 2019-08-12 ENCOUNTER — OFFICE VISIT (OUTPATIENT)
Dept: CARDIOLOGY | Age: 74
End: 2019-08-12

## 2019-08-12 VITALS
DIASTOLIC BLOOD PRESSURE: 64 MMHG | WEIGHT: 112 LBS | OXYGEN SATURATION: 99 % | HEIGHT: 58 IN | SYSTOLIC BLOOD PRESSURE: 148 MMHG | HEART RATE: 59 BPM | BODY MASS INDEX: 23.51 KG/M2

## 2019-08-12 DIAGNOSIS — E78.49 OTHER HYPERLIPIDEMIA: ICD-10-CM

## 2019-08-12 DIAGNOSIS — I10 ESSENTIAL HYPERTENSION: ICD-10-CM

## 2019-08-12 DIAGNOSIS — I25.10 ATHEROSCLEROSIS OF NATIVE CORONARY ARTERY OF NATIVE HEART WITHOUT ANGINA PECTORIS: Primary | ICD-10-CM

## 2019-08-12 PROCEDURE — 99214 OFFICE O/P EST MOD 30 MIN: CPT | Performed by: INTERNAL MEDICINE

## 2019-08-19 RX ORDER — LISINOPRIL 10 MG/1
10 TABLET ORAL DAILY
Qty: 90 TABLET | Refills: 1 | OUTPATIENT
Start: 2019-08-19 | End: 2020-08-13

## 2019-08-21 ENCOUNTER — TELEPHONE (OUTPATIENT)
Dept: FAMILY MEDICINE CLINIC | Facility: CLINIC | Age: 74
End: 2019-08-21

## 2019-08-22 NOTE — TELEPHONE ENCOUNTER
----- Message from Julissa Corado sent at 8/21/2019  9:29 AM CDT -----  Regarding: Prescription Question  Contact: [phone]  Hi, I need the Lisinopril 10 mg since last time I requested a refill for the 40mg they sent the rx for 30mg.  2nd request.

## 2019-09-09 RX ORDER — CARVEDILOL 6.25 MG/1
TABLET ORAL
Qty: 180 TABLET | Refills: 2 | Status: SHIPPED | OUTPATIENT
Start: 2019-09-09 | End: 2020-06-08

## 2019-09-23 RX ORDER — CLOPIDOGREL BISULFATE 75 MG/1
TABLET ORAL
Qty: 90 TABLET | Refills: 2 | Status: SHIPPED | OUTPATIENT
Start: 2019-09-23 | End: 2020-06-15

## 2019-09-23 RX ORDER — FUROSEMIDE 20 MG/1
TABLET ORAL
Qty: 90 TABLET | Refills: 2 | Status: SHIPPED | OUTPATIENT
Start: 2019-09-23 | End: 2020-06-08

## 2019-10-01 ENCOUNTER — LAB ENCOUNTER (OUTPATIENT)
Dept: LAB | Age: 74
End: 2019-10-01
Attending: INTERNAL MEDICINE
Payer: MEDICARE

## 2019-10-01 DIAGNOSIS — E78.5 HYPERLIPIDEMIA: ICD-10-CM

## 2019-10-01 DIAGNOSIS — I25.10 ATHEROSCLEROTIC HEART DISEASE OF NATIVE CORONARY ARTERY WITHOUT ANGINA PECTORIS: Primary | ICD-10-CM

## 2019-10-01 LAB
ALT SERPL-CCNC: 17 U/L
AST: 16
CHOLEST SERPL-MCNC: 109 MG/DL
CHOLEST/HDLC SERPL: NORMAL {RATIO}
HDLC SERPL-MCNC: 58 MG/DL
LDLC SERPL CALC-MCNC: 39 MG/DL
LENGTH OF FAST TIME PATIENT: NORMAL H
NONHDLC SERPL-MCNC: 51 MG/DL
TRIGL SERPL-MCNC: 59 MG/DL
VLDLC SERPL CALC-MCNC: NORMAL MG/DL

## 2019-10-01 PROCEDURE — 84450 TRANSFERASE (AST) (SGOT): CPT

## 2019-10-01 PROCEDURE — 80061 LIPID PANEL: CPT

## 2019-10-01 PROCEDURE — 36415 COLL VENOUS BLD VENIPUNCTURE: CPT

## 2019-10-01 PROCEDURE — 84460 ALANINE AMINO (ALT) (SGPT): CPT

## 2019-10-03 ENCOUNTER — CLINICAL ABSTRACT (OUTPATIENT)
Dept: CARDIOLOGY | Age: 74
End: 2019-10-03

## 2019-10-04 ENCOUNTER — TELEPHONE (OUTPATIENT)
Dept: CARDIOLOGY | Age: 74
End: 2019-10-04

## 2019-10-08 ENCOUNTER — TELEPHONE (OUTPATIENT)
Dept: FAMILY MEDICINE CLINIC | Facility: CLINIC | Age: 74
End: 2019-10-08

## 2019-10-08 NOTE — TELEPHONE ENCOUNTER
Sister, states that the blood pressure medication is causing the patient to cough. Sister, would like to know if the doctor can change the medication. Transferred call to triage.

## 2019-10-08 NOTE — TELEPHONE ENCOUNTER
Spoke with patient, reports currently taking Lisinopril daily for HTN, has been having ongoing coughing all day, denied any other symptoms, feels to be related to use of her Lisinopril currently on 40mg daily.  Patient requesting an alternative please advis

## 2019-10-10 NOTE — TELEPHONE ENCOUNTER
Followed up with daughter (DANIEL) advised of recommendation for appt, daughter agreed, appt scheduled for 10/16 8:45am with Dr Robi Nunn.

## 2019-10-16 ENCOUNTER — OFFICE VISIT (OUTPATIENT)
Dept: FAMILY MEDICINE CLINIC | Facility: CLINIC | Age: 74
End: 2019-10-16
Payer: MEDICARE

## 2019-10-16 VITALS
BODY MASS INDEX: 22.54 KG/M2 | TEMPERATURE: 98 F | WEIGHT: 111.81 LBS | HEART RATE: 54 BPM | DIASTOLIC BLOOD PRESSURE: 84 MMHG | SYSTOLIC BLOOD PRESSURE: 150 MMHG | HEIGHT: 59 IN

## 2019-10-16 DIAGNOSIS — I10 ESSENTIAL HYPERTENSION: Primary | ICD-10-CM

## 2019-10-16 PROCEDURE — 90662 IIV NO PRSV INCREASED AG IM: CPT | Performed by: FAMILY MEDICINE

## 2019-10-16 PROCEDURE — 99213 OFFICE O/P EST LOW 20 MIN: CPT | Performed by: FAMILY MEDICINE

## 2019-10-16 PROCEDURE — G0463 HOSPITAL OUTPT CLINIC VISIT: HCPCS | Performed by: FAMILY MEDICINE

## 2019-10-16 PROCEDURE — G0008 ADMIN INFLUENZA VIRUS VAC: HCPCS | Performed by: FAMILY MEDICINE

## 2019-10-16 PROCEDURE — G0009 ADMIN PNEUMOCOCCAL VACCINE: HCPCS | Performed by: FAMILY MEDICINE

## 2019-10-16 PROCEDURE — 90732 PPSV23 VACC 2 YRS+ SUBQ/IM: CPT | Performed by: FAMILY MEDICINE

## 2019-10-16 NOTE — PROGRESS NOTES
10/16/2019  9:17 AM    Alecia Bond is a 69year old female. Chief complaint(s): Patient presents with:  Hypertension: f/u  Cough: dry on and off x 1 year    HPI:     Alecia Bond primary complaint is regarding HTN.      Halima Garland a 69 year daily., Disp: 90 tablet, Rfl: 0  IRON 325 (65 Fe) MG Oral Tab, TAKE 1 TABLET BY MOUTH TWICE DAILY, Disp: 180 tablet, Rfl: 1  Pantoprazole Sodium 20 MG Oral Tab EC, Take 1 tablet (20 mg total) by mouth every morning before breakfast., Disp: 90 tablet, Rfl: IMM, 23    RECOMMENDATIONS given include: avoid pseudoephedrine or other stimulants/decongestants in common cold remedies, decrease consumption of alcohol, perform routine monitoring of blood pressure with home blood pressure cuff, exercise, reduction of d

## 2019-11-04 RX ORDER — PNV NO.95/FERROUS FUM/FOLIC AC 28MG-0.8MG
TABLET ORAL
Qty: 180 TABLET | Refills: 1 | Status: SHIPPED | OUTPATIENT
Start: 2019-11-04 | End: 2020-05-05

## 2019-11-04 NOTE — TELEPHONE ENCOUNTER
Review pended refill request as it does not fall under a protocol.     Last Rx: 08/04/19  LOV: 10/16/19

## 2020-01-15 RX ORDER — LISINOPRIL 40 MG/1
40 TABLET ORAL DAILY
Qty: 90 TABLET | Refills: 0 | Status: SHIPPED | OUTPATIENT
Start: 2020-01-15 | End: 2020-03-16

## 2020-02-11 SDOH — HEALTH STABILITY: MENTAL HEALTH: HOW OFTEN DO YOU HAVE A DRINK CONTAINING ALCOHOL?: NEVER

## 2020-02-17 ENCOUNTER — APPOINTMENT (OUTPATIENT)
Dept: CARDIOLOGY | Age: 75
End: 2020-02-17

## 2020-03-16 RX ORDER — LISINOPRIL 40 MG/1
TABLET ORAL
Qty: 90 TABLET | Refills: 0 | Status: SHIPPED | OUTPATIENT
Start: 2020-03-16 | End: 2020-07-06

## 2020-03-30 RX ORDER — PANTOPRAZOLE SODIUM 20 MG/1
20 TABLET, DELAYED RELEASE ORAL
Qty: 90 TABLET | Refills: 3 | Status: SHIPPED | OUTPATIENT
Start: 2020-03-30 | End: 2020-04-01

## 2020-04-01 RX ORDER — PANTOPRAZOLE SODIUM 20 MG/1
20 TABLET, DELAYED RELEASE ORAL
Qty: 90 TABLET | Refills: 3 | OUTPATIENT
Start: 2020-04-01 | End: 2020-04-02

## 2020-04-02 RX ORDER — PANTOPRAZOLE SODIUM 20 MG/1
20 TABLET, DELAYED RELEASE ORAL
Qty: 90 TABLET | Refills: 3 | Status: SHIPPED | OUTPATIENT
Start: 2020-04-02 | End: 2020-04-02

## 2020-04-02 RX ORDER — PANTOPRAZOLE SODIUM 20 MG/1
20 TABLET, DELAYED RELEASE ORAL
Qty: 90 TABLET | Refills: 3 | Status: SHIPPED | OUTPATIENT
Start: 2020-04-02 | End: 2021-01-20

## 2020-05-05 RX ORDER — PNV NO.95/FERROUS FUM/FOLIC AC 28MG-0.8MG
TABLET ORAL
Qty: 180 TABLET | Refills: 0 | Status: SHIPPED | OUTPATIENT
Start: 2020-05-05 | End: 2021-01-20 | Stop reason: ALTCHOICE

## 2020-06-07 DIAGNOSIS — I50.23 ACUTE ON CHRONIC SYSTOLIC CHF (CONGESTIVE HEART FAILURE) (CMD): ICD-10-CM

## 2020-06-07 DIAGNOSIS — I10 ESSENTIAL HYPERTENSION: Primary | ICD-10-CM

## 2020-06-08 RX ORDER — CARVEDILOL 6.25 MG/1
TABLET ORAL
Qty: 180 TABLET | Refills: 0 | Status: SHIPPED | OUTPATIENT
Start: 2020-06-08 | End: 2020-09-01

## 2020-06-08 RX ORDER — FUROSEMIDE 20 MG/1
TABLET ORAL
Qty: 90 TABLET | Refills: 0 | Status: SHIPPED | OUTPATIENT
Start: 2020-06-08 | End: 2020-06-15

## 2020-06-15 RX ORDER — CLOPIDOGREL BISULFATE 75 MG/1
TABLET ORAL
Qty: 90 TABLET | Refills: 0 | Status: SHIPPED | OUTPATIENT
Start: 2020-06-15 | End: 2020-09-15

## 2020-06-15 RX ORDER — FUROSEMIDE 20 MG/1
TABLET ORAL
Qty: 90 TABLET | Refills: 0 | Status: SHIPPED | OUTPATIENT
Start: 2020-06-15 | End: 2020-12-15

## 2020-07-06 RX ORDER — LISINOPRIL 40 MG/1
TABLET ORAL
Qty: 90 TABLET | Refills: 0 | Status: SHIPPED | OUTPATIENT
Start: 2020-07-06 | End: 2020-09-28

## 2020-08-03 RX ORDER — PNV NO.95/FERROUS FUM/FOLIC AC 28MG-0.8MG
TABLET ORAL
Qty: 180 TABLET | Refills: 0 | OUTPATIENT
Start: 2020-08-03

## 2020-08-04 ENCOUNTER — TELEPHONE (OUTPATIENT)
Dept: CARDIOLOGY | Age: 75
End: 2020-08-04

## 2020-08-04 RX ORDER — ATORVASTATIN CALCIUM 40 MG/1
40 TABLET, FILM COATED ORAL DAILY
Qty: 30 TABLET | Refills: 0 | Status: SHIPPED | OUTPATIENT
Start: 2020-08-04 | End: 2020-08-20 | Stop reason: SDUPTHER

## 2020-08-20 ENCOUNTER — OFFICE VISIT (OUTPATIENT)
Dept: CARDIOLOGY | Age: 75
End: 2020-08-20

## 2020-08-20 VITALS
HEART RATE: 54 BPM | DIASTOLIC BLOOD PRESSURE: 72 MMHG | OXYGEN SATURATION: 100 % | BODY MASS INDEX: 23.72 KG/M2 | WEIGHT: 113 LBS | SYSTOLIC BLOOD PRESSURE: 142 MMHG | HEIGHT: 58 IN

## 2020-08-20 DIAGNOSIS — I25.10 ATHEROSCLEROSIS OF NATIVE CORONARY ARTERY OF NATIVE HEART WITHOUT ANGINA PECTORIS: ICD-10-CM

## 2020-08-20 DIAGNOSIS — E78.49 OTHER HYPERLIPIDEMIA: ICD-10-CM

## 2020-08-20 DIAGNOSIS — I10 ESSENTIAL HYPERTENSION: Primary | ICD-10-CM

## 2020-08-20 PROCEDURE — 99214 OFFICE O/P EST MOD 30 MIN: CPT | Performed by: INTERNAL MEDICINE

## 2020-08-20 SDOH — HEALTH STABILITY: MENTAL HEALTH: HOW OFTEN DO YOU HAVE A DRINK CONTAINING ALCOHOL?: NEVER

## 2020-08-20 ASSESSMENT — PATIENT HEALTH QUESTIONNAIRE - PHQ9
SUM OF ALL RESPONSES TO PHQ9 QUESTIONS 1 AND 2: 0
SUM OF ALL RESPONSES TO PHQ9 QUESTIONS 1 AND 2: 0
2. FEELING DOWN, DEPRESSED OR HOPELESS: NOT AT ALL
1. LITTLE INTEREST OR PLEASURE IN DOING THINGS: NOT AT ALL
CLINICAL INTERPRETATION OF PHQ2 SCORE: NO FURTHER SCREENING NEEDED
CLINICAL INTERPRETATION OF PHQ9 SCORE: NO FURTHER SCREENING NEEDED

## 2020-08-31 RX ORDER — PNV NO.95/FERROUS FUM/FOLIC AC 28MG-0.8MG
TABLET ORAL
Qty: 180 TABLET | Refills: 0 | OUTPATIENT
Start: 2020-08-31

## 2020-09-01 RX ORDER — CARVEDILOL 6.25 MG/1
TABLET ORAL
Qty: 180 TABLET | Refills: 0 | Status: SHIPPED | OUTPATIENT
Start: 2020-09-01 | End: 2020-11-30

## 2020-09-01 RX ORDER — ATORVASTATIN CALCIUM 40 MG/1
TABLET, FILM COATED ORAL
Qty: 30 TABLET | Refills: 0 | Status: SHIPPED | OUTPATIENT
Start: 2020-09-01 | End: 2020-09-28

## 2020-09-11 ENCOUNTER — ANCILLARY PROCEDURE (OUTPATIENT)
Dept: CARDIOLOGY | Age: 75
End: 2020-09-11
Attending: INTERNAL MEDICINE

## 2020-09-11 VITALS
BODY MASS INDEX: 19.29 KG/M2 | WEIGHT: 113 LBS | DIASTOLIC BLOOD PRESSURE: 80 MMHG | HEIGHT: 64 IN | SYSTOLIC BLOOD PRESSURE: 180 MMHG | HEART RATE: 52 BPM

## 2020-09-11 DIAGNOSIS — E78.49 OTHER HYPERLIPIDEMIA: ICD-10-CM

## 2020-09-11 DIAGNOSIS — I10 ESSENTIAL HYPERTENSION: ICD-10-CM

## 2020-09-11 DIAGNOSIS — I25.10 ATHEROSCLEROSIS OF NATIVE CORONARY ARTERY OF NATIVE HEART WITHOUT ANGINA PECTORIS: ICD-10-CM

## 2020-09-11 LAB
HEART RATE RESERVE PREDICTED: 51.37 BPM
LV EF: 48 %
PEAK HR ACHIEVED: 71 BPM
RESTING HR ACHIEVED: 52 BPM
STRESS BASELINE BP: NORMAL MMHG
STRESS PERCENT HR: 49 %
STRESS POST PEAK BP: NORMAL MMHG
STRESS TARGET HR: 146 BPM

## 2020-09-11 PROCEDURE — 93018 CV STRESS TEST I&R ONLY: CPT | Performed by: INTERNAL MEDICINE

## 2020-09-11 PROCEDURE — 78452 HT MUSCLE IMAGE SPECT MULT: CPT | Performed by: INTERNAL MEDICINE

## 2020-09-11 PROCEDURE — 93017 CV STRESS TEST TRACING ONLY: CPT | Performed by: INTERNAL MEDICINE

## 2020-09-11 PROCEDURE — 93016 CV STRESS TEST SUPVJ ONLY: CPT | Performed by: INTERNAL MEDICINE

## 2020-09-11 PROCEDURE — A9502 TC99M TETROFOSMIN: HCPCS | Performed by: INTERNAL MEDICINE

## 2020-09-11 RX ORDER — REGADENOSON 0.08 MG/ML
0.4 INJECTION, SOLUTION INTRAVENOUS ONCE
Status: COMPLETED | OUTPATIENT
Start: 2020-09-11 | End: 2020-09-11

## 2020-09-11 RX ADMIN — REGADENOSON 0.4 MG: 0.08 INJECTION, SOLUTION INTRAVENOUS at 09:05

## 2020-09-11 ASSESSMENT — EXERCISE STRESS TEST
PEAK_BP: 180/80
PEAK_RPP: 12070
PEAK_HR: 103
PEAK_HR: 71
PEAK_HR: 88
PEAK_BP: 170/70
PEAK_HR: 52
PEAK_RPP: 16480
PEAK_HR: 81
STOPPAGE_REASON: PROTOCOL COMPLETE
STAGE_CATEGORIES: RECOVERY 2
PEAK_RPP: 13122
PEAK_RPP: 9360
COMMENTS: INJECTED AT 0:30
STAGE_CATEGORIES: RECOVERY 0
STAGE_CATEGORIES: RECOVERY 3
STAGE_CATEGORIES: RESTING
PEAK_BP: 160/68
PEAK_BP: 162/70
PEAK_RPP: 13904
STAGE_CATEGORIES: RECOVERY 1
PEAK_BP: 158/70

## 2020-09-12 DIAGNOSIS — I25.10 ATHEROSCLEROSIS OF NATIVE CORONARY ARTERY OF NATIVE HEART WITHOUT ANGINA PECTORIS: Primary | ICD-10-CM

## 2020-09-14 ENCOUNTER — TELEPHONE (OUTPATIENT)
Dept: CARDIOLOGY | Age: 75
End: 2020-09-14

## 2020-09-15 RX ORDER — CLOPIDOGREL BISULFATE 75 MG/1
TABLET ORAL
Qty: 90 TABLET | Refills: 0 | Status: SHIPPED | OUTPATIENT
Start: 2020-09-15 | End: 2020-12-15

## 2020-09-28 RX ORDER — LISINOPRIL 40 MG/1
TABLET ORAL
Qty: 90 TABLET | Refills: 0 | Status: SHIPPED | OUTPATIENT
Start: 2020-09-28 | End: 2021-01-04

## 2020-09-28 RX ORDER — ATORVASTATIN CALCIUM 40 MG/1
40 TABLET, FILM COATED ORAL DAILY
Qty: 30 TABLET | Refills: 2 | Status: SHIPPED | OUTPATIENT
Start: 2020-09-28 | End: 2020-12-28

## 2020-10-14 NOTE — PLAN OF CARE
Problem: Patient Centered Care  Goal: Patient preferences are identified and integrated in the patient's plan of care  Interventions:  - What would you like us to know as we care for you? I am nervous about my health. I feel better when my family is here. MICU

## 2020-11-30 RX ORDER — CARVEDILOL 6.25 MG/1
TABLET ORAL
Qty: 180 TABLET | Refills: 0 | Status: SHIPPED | OUTPATIENT
Start: 2020-11-30

## 2020-12-15 RX ORDER — FUROSEMIDE 20 MG/1
TABLET ORAL
Qty: 30 TABLET | Refills: 0 | Status: SHIPPED | OUTPATIENT
Start: 2020-12-15

## 2020-12-15 RX ORDER — CLOPIDOGREL BISULFATE 75 MG/1
TABLET ORAL
Qty: 30 TABLET | Refills: 0 | Status: SHIPPED | OUTPATIENT
Start: 2020-12-15 | End: 2021-01-05

## 2020-12-28 RX ORDER — ATORVASTATIN CALCIUM 40 MG/1
TABLET, FILM COATED ORAL
Qty: 90 TABLET | Refills: 0 | Status: SHIPPED | OUTPATIENT
Start: 2020-12-28 | End: 2021-03-30

## 2021-01-04 RX ORDER — LISINOPRIL 40 MG/1
40 TABLET ORAL DAILY
Qty: 10 TABLET | Refills: 0 | Status: SHIPPED | OUTPATIENT
Start: 2021-01-04 | End: 2021-01-20

## 2021-01-05 RX ORDER — CLOPIDOGREL BISULFATE 75 MG/1
TABLET ORAL
Qty: 30 TABLET | Refills: 0 | Status: SHIPPED | OUTPATIENT
Start: 2021-01-05 | End: 2021-02-15

## 2021-01-12 RX ORDER — LISINOPRIL 40 MG/1
40 TABLET ORAL DAILY
Qty: 10 TABLET | Refills: 0 | OUTPATIENT
Start: 2021-01-12

## 2021-01-12 NOTE — TELEPHONE ENCOUNTER
Please call patient to set up an appointment with me, past due f/u unable to continue given refills until  Next appt with Dr Vance Hill

## 2021-01-18 RX ORDER — FUROSEMIDE 20 MG/1
TABLET ORAL
Qty: 30 TABLET | Refills: 0 | OUTPATIENT
Start: 2021-01-18

## 2021-01-20 ENCOUNTER — OFFICE VISIT (OUTPATIENT)
Dept: FAMILY MEDICINE CLINIC | Facility: CLINIC | Age: 76
End: 2021-01-20
Payer: MEDICARE

## 2021-01-20 VITALS
SYSTOLIC BLOOD PRESSURE: 134 MMHG | DIASTOLIC BLOOD PRESSURE: 75 MMHG | BODY MASS INDEX: 23.79 KG/M2 | WEIGHT: 118 LBS | HEART RATE: 56 BPM | HEIGHT: 59 IN

## 2021-01-20 DIAGNOSIS — I10 ESSENTIAL HYPERTENSION: Primary | ICD-10-CM

## 2021-01-20 DIAGNOSIS — D50.0 IRON DEFICIENCY ANEMIA DUE TO CHRONIC BLOOD LOSS: ICD-10-CM

## 2021-01-20 DIAGNOSIS — K27.9 PUD (PEPTIC ULCER DISEASE): ICD-10-CM

## 2021-01-20 DIAGNOSIS — Z12.31 VISIT FOR SCREENING MAMMOGRAM: ICD-10-CM

## 2021-01-20 LAB
CUVETTE LOT #: ABNORMAL NUMERIC
HEMOGLOBIN: 11.1 G/DL (ref 12–15)

## 2021-01-20 PROCEDURE — 85018 HEMOGLOBIN: CPT | Performed by: FAMILY MEDICINE

## 2021-01-20 PROCEDURE — 36416 COLLJ CAPILLARY BLOOD SPEC: CPT | Performed by: FAMILY MEDICINE

## 2021-01-20 PROCEDURE — 99214 OFFICE O/P EST MOD 30 MIN: CPT | Performed by: FAMILY MEDICINE

## 2021-01-20 RX ORDER — PANTOPRAZOLE SODIUM 20 MG/1
20 TABLET, DELAYED RELEASE ORAL
Qty: 90 TABLET | Refills: 3 | Status: SHIPPED | OUTPATIENT
Start: 2021-01-20

## 2021-01-20 RX ORDER — FUROSEMIDE 20 MG/1
TABLET ORAL
Qty: 30 TABLET | Refills: 0 | OUTPATIENT
Start: 2021-01-20

## 2021-01-20 RX ORDER — FUROSEMIDE 20 MG/1
TABLET ORAL
COMMUNITY
Start: 2020-12-15 | End: 2021-01-20

## 2021-01-20 RX ORDER — CLOPIDOGREL BISULFATE 75 MG/1
TABLET ORAL
COMMUNITY
Start: 2021-01-05 | End: 2021-01-20

## 2021-01-20 RX ORDER — CARVEDILOL 6.25 MG/1
6.25 TABLET ORAL 2 TIMES DAILY WITH MEALS
Qty: 180 TABLET | Refills: 1 | Status: SHIPPED | OUTPATIENT
Start: 2021-01-20 | End: 2021-08-24

## 2021-01-20 RX ORDER — ATORVASTATIN CALCIUM 40 MG/1
40 TABLET, FILM COATED ORAL DAILY
COMMUNITY
Start: 2020-12-28

## 2021-01-20 RX ORDER — FUROSEMIDE 20 MG/1
20 TABLET ORAL DAILY
Qty: 30 TABLET | Refills: 0 | Status: SHIPPED | OUTPATIENT
Start: 2021-01-20 | End: 2021-02-18

## 2021-01-20 RX ORDER — LISINOPRIL 40 MG/1
40 TABLET ORAL DAILY
Qty: 10 TABLET | Refills: 0 | Status: SHIPPED | OUTPATIENT
Start: 2021-01-20 | End: 2021-01-21

## 2021-01-20 RX ORDER — CARVEDILOL 6.25 MG/1
TABLET ORAL
COMMUNITY
Start: 2020-11-30 | End: 2021-09-11 | Stop reason: ALTCHOICE

## 2021-01-20 RX ORDER — ATORVASTATIN CALCIUM 40 MG/1
TABLET, FILM COATED ORAL
COMMUNITY
Start: 2020-12-28 | End: 2021-01-20

## 2021-01-20 RX ORDER — LISINOPRIL 40 MG/1
TABLET ORAL
COMMUNITY
End: 2021-01-20

## 2021-01-20 NOTE — PROGRESS NOTES
1/20/2021  9:57 AM    Mason Caceres is a 77year old female. Chief complaint(s): Patient presents with:  Medication Request    HPI:     Mason Caceres primary complaint is regarding HTN.      Nighat OTERO 80 year old female presents with hypete reviewed. No pertinent family history.    Social History: Social History    Tobacco Use      Smoking status: Never Smoker      Smokeless tobacco: Never Used    Alcohol use: No    Drug use: No       Immunizations:     Immunization History  Administered regular rhythm, S1 normal and S2 normal.    Pulmonary/Chest: Effort normal and breath sounds normal.   Lymphadenopathy:   LEs no edema   Skin: No rash noted. LABORATORY RESULTS:     EKG / Spirometry : -     Radiology: No results found.      ASSESSMENT (two) times daily with meals. • furosemide 20 MG Oral Tab 30 tablet 0     Sig: Take 1 tablet (20 mg total) by mouth daily.    • Pantoprazole Sodium 20 MG Oral Tab EC 90 tablet 3     Sig: Take 1 tablet (20 mg total) by mouth every morning before breakfast.

## 2021-01-21 ENCOUNTER — LAB ENCOUNTER (OUTPATIENT)
Dept: LAB | Age: 76
End: 2021-01-21
Attending: INTERNAL MEDICINE
Payer: MEDICARE

## 2021-01-21 DIAGNOSIS — I10 ESSENTIAL HYPERTENSION: ICD-10-CM

## 2021-01-21 LAB
ALBUMIN SERPL-MCNC: 4 G/DL (ref 3.4–5)
ALBUMIN/GLOB SERPL: 1 {RATIO} (ref 1–2)
ALP LIVER SERPL-CCNC: 85 U/L
ALT SERPL-CCNC: 21 U/L
ANION GAP SERPL CALC-SCNC: 3 MMOL/L (ref 0–18)
AST SERPL-CCNC: 20 U/L (ref 15–37)
BILIRUB SERPL-MCNC: 0.7 MG/DL (ref 0.1–2)
BUN BLD-MCNC: 13 MG/DL (ref 7–18)
BUN/CREAT SERPL: 14.9 (ref 10–20)
CALCIUM BLD-MCNC: 9.4 MG/DL (ref 8.5–10.1)
CHLORIDE SERPL-SCNC: 106 MMOL/L (ref 98–112)
CHOLEST SERPL-MCNC: 125 MG/DL
CHOLEST SMN-MCNC: 125 MG/DL (ref ?–200)
CO2 SERPL-SCNC: 29 MMOL/L (ref 21–32)
CREAT BLD-MCNC: 0.87 MG/DL
GLOBULIN PLAS-MCNC: 3.9 G/DL (ref 2.8–4.4)
GLUCOSE BLD-MCNC: 92 MG/DL (ref 70–99)
HDLC SERPL-MCNC: 69 MG/DL
HDLC SERPL-MCNC: 69 MG/DL (ref 40–59)
LDLC SERPL CALC-MCNC: 45 MG/DL
LDLC SERPL CALC-MCNC: 45 MG/DL (ref ?–100)
LENGTH OF FAST TIME PATIENT: YES H
M PROTEIN MFR SERPL ELPH: 7.9 G/DL (ref 6.4–8.2)
NONHDLC SERPL-MCNC: 56 MG/DL
NONHDLC SERPL-MCNC: 56 MG/DL (ref ?–130)
OSMOLALITY SERPL CALC.SUM OF ELEC: 286 MOSM/KG (ref 275–295)
PATIENT FASTING Y/N/NP: YES
PATIENT FASTING Y/N/NP: YES
POTASSIUM SERPL-SCNC: 4.5 MMOL/L (ref 3.5–5.1)
SODIUM SERPL-SCNC: 138 MMOL/L (ref 136–145)
TRIGL SERPL-MCNC: 54 MG/DL
TRIGL SERPL-MCNC: 54 MG/DL (ref 30–149)
VLDLC SERPL CALC-MCNC: 11 MG/DL
VLDLC SERPL CALC-MCNC: 11 MG/DL (ref 0–30)

## 2021-01-21 PROCEDURE — 36415 COLL VENOUS BLD VENIPUNCTURE: CPT

## 2021-01-21 PROCEDURE — 80061 LIPID PANEL: CPT

## 2021-01-21 PROCEDURE — 80053 COMPREHEN METABOLIC PANEL: CPT

## 2021-01-21 RX ORDER — LISINOPRIL 40 MG/1
TABLET ORAL
Qty: 10 TABLET | Refills: 0 | Status: SHIPPED | OUTPATIENT
Start: 2021-01-21 | End: 2021-01-22

## 2021-01-24 RX ORDER — LISINOPRIL 40 MG/1
40 TABLET ORAL DAILY
Qty: 90 TABLET | Refills: 1 | Status: SHIPPED | OUTPATIENT
Start: 2021-01-24 | End: 2021-06-17

## 2021-01-24 RX ORDER — LISINOPRIL 40 MG/1
40 TABLET ORAL DAILY
Qty: 90 TABLET | Refills: 1 | Status: SHIPPED | OUTPATIENT
Start: 2021-01-24 | End: 2021-11-08

## 2021-02-15 RX ORDER — CLOPIDOGREL BISULFATE 75 MG/1
TABLET ORAL
Qty: 30 TABLET | Refills: 0 | Status: SHIPPED | OUTPATIENT
Start: 2021-02-15 | End: 2021-03-23

## 2021-02-18 RX ORDER — FUROSEMIDE 20 MG/1
TABLET ORAL
Qty: 30 TABLET | Refills: 0 | Status: SHIPPED | OUTPATIENT
Start: 2021-02-18 | End: 2021-03-20

## 2021-02-23 ENCOUNTER — TELEPHONE (OUTPATIENT)
Dept: CARDIOLOGY | Age: 76
End: 2021-02-23

## 2021-03-12 DIAGNOSIS — Z23 NEED FOR VACCINATION: ICD-10-CM

## 2021-03-22 RX ORDER — FUROSEMIDE 20 MG/1
20 TABLET ORAL DAILY
Qty: 30 TABLET | Refills: 0 | Status: SHIPPED | OUTPATIENT
Start: 2021-03-22 | End: 2021-04-11

## 2021-03-23 ENCOUNTER — LAB ENCOUNTER (OUTPATIENT)
Dept: LAB | Age: 76
End: 2021-03-23
Attending: INTERNAL MEDICINE
Payer: MEDICARE

## 2021-03-23 DIAGNOSIS — I25.10 CORONARY ATHEROSCLEROSIS OF NATIVE CORONARY ARTERY: Primary | ICD-10-CM

## 2021-03-23 LAB
ABSOLUTE IMMATURE GRANULOCYTES (OFFPRE24): NORMAL
ABSOLUTE NRBC (AUTO): NORMAL
BASO+EOS+MONOS # BLD: NORMAL 10*3/UL
BASO+EOS+MONOS NFR BLD: NORMAL %
BASOPHILS # BLD AUTO: 0.05 X10(3) UL (ref 0–0.2)
BASOPHILS # BLD: NORMAL 10*3/UL
BASOPHILS NFR BLD AUTO: 1.2 %
BASOPHILS NFR BLD: NORMAL %
DEPRECATED RDW RBC AUTO: 42.5 FL (ref 35.1–46.3)
DIFFERENTIAL METHOD BLD: NORMAL
EOSINOPHIL # BLD AUTO: 0.49 X10(3) UL (ref 0–0.7)
EOSINOPHIL # BLD: NORMAL 10*3/UL
EOSINOPHIL NFR BLD AUTO: 11.4 %
EOSINOPHIL NFR BLD: NORMAL %
ERYTHROCYTE [DISTWIDTH] IN BLOOD BY AUTOMATED COUNT: 12 % (ref 11–15)
ERYTHROCYTE [DISTWIDTH] IN BLOOD BY AUTOMATED COUNT: NORMAL %
ERYTHROCYTE [DISTWIDTH] IN BLOOD: NORMAL %
HCT CALC (HGB X3) (OFFPRE23): NORMAL
HCT VFR BLD AUTO: 36.3 %
HCT VFR BLD CALC: 36.3 %
HGB BLD-MCNC: 12 G/DL
HGB BLD-MCNC: 12 G/DL
IMM GRANULOCYTES # BLD AUTO: 0 X10(3) UL (ref 0–1)
IMM GRANULOCYTES NFR BLD: 0 %
IMMATURE GRANULOCYTES (OFFPRE25): NORMAL
LYMPHOCYTES # BLD AUTO: 1.34 X10(3) UL (ref 1–4)
LYMPHOCYTES # BLD: NORMAL 10*3/UL
LYMPHOCYTES NFR BLD AUTO: 31.2 %
LYMPHOCYTES NFR BLD: NORMAL %
MCH RBC QN AUTO: 32 PG
MCH RBC QN AUTO: 32 PG (ref 26–34)
MCHC RBC AUTO-ENTMCNC: 33.1 G/DL
MCHC RBC AUTO-ENTMCNC: 33.1 G/DL (ref 31–37)
MCV RBC AUTO: 96.8 FL
MCV RBC AUTO: 96.8 FL
MONOCYTES # BLD AUTO: 0.38 X10(3) UL (ref 0.1–1)
MONOCYTES # BLD: NORMAL 10*3/UL
MONOCYTES NFR BLD AUTO: 8.9 %
MONOCYTES NFR BLD: NORMAL %
MPV (OFFPRE2): NORMAL
NEUTROPHILS # BLD AUTO: 2.03 X10 (3) UL (ref 1.5–7.7)
NEUTROPHILS # BLD AUTO: 2.03 X10(3) UL (ref 1.5–7.7)
NEUTROPHILS # BLD: NORMAL 10*3/UL
NEUTROPHILS NFR BLD AUTO: 47.3 %
NEUTROPHILS NFR BLD: NORMAL %
NRBC BLD MANUAL-RTO: NORMAL %
PLAT MORPH BLD: NORMAL
PLATELET # BLD AUTO: 213 10(3)UL (ref 150–450)
PLATELET # BLD: 213 K/MCL
RBC # BLD AUTO: 3.75 X10(6)UL
RBC # BLD: 3.75 10*6/UL
RBC MORPH BLD: NORMAL
WBC # BLD AUTO: 4.3 X10(3) UL (ref 4–11)
WBC # BLD: 4.3 K/MCL
WBC MORPH BLD: NORMAL

## 2021-03-23 PROCEDURE — 85025 COMPLETE CBC W/AUTO DIFF WBC: CPT

## 2021-03-23 PROCEDURE — 36415 COLL VENOUS BLD VENIPUNCTURE: CPT

## 2021-03-23 RX ORDER — CLOPIDOGREL BISULFATE 75 MG/1
TABLET ORAL
Qty: 30 TABLET | Refills: 0 | Status: SHIPPED | OUTPATIENT
Start: 2021-03-23 | End: 2021-04-12

## 2021-03-24 ENCOUNTER — CLINICAL ABSTRACT (OUTPATIENT)
Dept: CARDIOLOGY | Age: 76
End: 2021-03-24

## 2021-03-25 ENCOUNTER — IMMUNIZATION (OUTPATIENT)
Dept: LAB | Facility: HOSPITAL | Age: 76
End: 2021-03-25
Attending: HOSPITALIST
Payer: MEDICARE

## 2021-03-25 DIAGNOSIS — Z23 NEED FOR VACCINATION: Primary | ICD-10-CM

## 2021-03-25 PROCEDURE — 0011A SARSCOV2 VAC 100MCG/0.5ML IM: CPT

## 2021-03-30 RX ORDER — ATORVASTATIN CALCIUM 40 MG/1
TABLET, FILM COATED ORAL
Qty: 90 TABLET | Refills: 0 | Status: SHIPPED | OUTPATIENT
Start: 2021-03-30 | End: 2021-06-28

## 2021-03-31 ENCOUNTER — TELEPHONE (OUTPATIENT)
Dept: CARDIOLOGY | Age: 76
End: 2021-03-31

## 2021-04-11 RX ORDER — FUROSEMIDE 20 MG/1
TABLET ORAL
Qty: 30 TABLET | Refills: 0 | Status: SHIPPED | OUTPATIENT
Start: 2021-04-11 | End: 2021-04-19

## 2021-04-12 RX ORDER — CLOPIDOGREL BISULFATE 75 MG/1
TABLET ORAL
Qty: 30 TABLET | Refills: 1 | Status: SHIPPED | OUTPATIENT
Start: 2021-04-12 | End: 2021-05-18

## 2021-04-19 RX ORDER — FUROSEMIDE 20 MG/1
TABLET ORAL
Qty: 30 TABLET | Refills: 0 | Status: SHIPPED | OUTPATIENT
Start: 2021-04-19 | End: 2021-05-20

## 2021-04-22 ENCOUNTER — IMMUNIZATION (OUTPATIENT)
Dept: LAB | Facility: HOSPITAL | Age: 76
End: 2021-04-22
Attending: EMERGENCY MEDICINE
Payer: MEDICARE

## 2021-04-22 DIAGNOSIS — Z23 NEED FOR VACCINATION: Primary | ICD-10-CM

## 2021-04-22 PROCEDURE — 0012A SARSCOV2 VAC 100MCG/0.5ML IM: CPT

## 2021-05-18 RX ORDER — CLOPIDOGREL BISULFATE 75 MG/1
TABLET ORAL
Qty: 30 TABLET | Refills: 0 | Status: SHIPPED | OUTPATIENT
Start: 2021-05-18

## 2021-05-20 RX ORDER — FUROSEMIDE 20 MG/1
TABLET ORAL
Qty: 30 TABLET | Refills: 0 | Status: SHIPPED | OUTPATIENT
Start: 2021-05-20 | End: 2021-07-12

## 2021-06-01 RX ORDER — FUROSEMIDE 20 MG/1
20 TABLET ORAL DAILY
Qty: 30 TABLET | Refills: 0 | OUTPATIENT
Start: 2021-06-01

## 2021-06-17 ENCOUNTER — LAB ENCOUNTER (OUTPATIENT)
Dept: LAB | Age: 76
End: 2021-06-17
Attending: FAMILY MEDICINE
Payer: MEDICARE

## 2021-06-17 ENCOUNTER — OFFICE VISIT (OUTPATIENT)
Dept: FAMILY MEDICINE CLINIC | Facility: CLINIC | Age: 76
End: 2021-06-17
Payer: MEDICARE

## 2021-06-17 VITALS
HEART RATE: 66 BPM | BODY MASS INDEX: 23.95 KG/M2 | SYSTOLIC BLOOD PRESSURE: 180 MMHG | DIASTOLIC BLOOD PRESSURE: 82 MMHG | HEIGHT: 59 IN | WEIGHT: 118.81 LBS

## 2021-06-17 DIAGNOSIS — I10 ESSENTIAL HYPERTENSION: ICD-10-CM

## 2021-06-17 DIAGNOSIS — K29.70 HELICOBACTER PYLORI GASTRITIS: ICD-10-CM

## 2021-06-17 DIAGNOSIS — K64.8 INTERNAL HEMORRHOID, BLEEDING: ICD-10-CM

## 2021-06-17 DIAGNOSIS — R73.9 HYPERGLYCEMIA: ICD-10-CM

## 2021-06-17 DIAGNOSIS — M85.89 OSTEOPENIA OF MULTIPLE SITES: ICD-10-CM

## 2021-06-17 DIAGNOSIS — D49.0 PAROTID TUMOR: ICD-10-CM

## 2021-06-17 DIAGNOSIS — D50.0 IRON DEFICIENCY ANEMIA DUE TO CHRONIC BLOOD LOSS: ICD-10-CM

## 2021-06-17 DIAGNOSIS — Z00.00 MEDICARE ANNUAL WELLNESS VISIT, SUBSEQUENT: Primary | ICD-10-CM

## 2021-06-17 DIAGNOSIS — E55.9 VITAMIN D DEFICIENCY: ICD-10-CM

## 2021-06-17 DIAGNOSIS — Z12.31 ENCOUNTER FOR SCREENING MAMMOGRAM FOR MALIGNANT NEOPLASM OF BREAST: ICD-10-CM

## 2021-06-17 DIAGNOSIS — K64.8 INTERNAL HEMORRHOIDS WITHOUT COMPLICATION: ICD-10-CM

## 2021-06-17 DIAGNOSIS — B96.81 HELICOBACTER PYLORI GASTRITIS: ICD-10-CM

## 2021-06-17 DIAGNOSIS — Z12.11 COLON CANCER SCREENING: ICD-10-CM

## 2021-06-17 DIAGNOSIS — K27.9 PUD (PEPTIC ULCER DISEASE): ICD-10-CM

## 2021-06-17 PROBLEM — K92.1 HEMATOCHEZIA: Status: RESOLVED | Noted: 2018-11-21 | Resolved: 2021-06-17

## 2021-06-17 PROBLEM — I16.1 HYPERTENSIVE EMERGENCY: Status: RESOLVED | Noted: 2018-07-26 | Resolved: 2021-06-17

## 2021-06-17 PROBLEM — J81.0 ACUTE PULMONARY EDEMA (HCC): Status: RESOLVED | Noted: 2018-07-25 | Resolved: 2021-06-17

## 2021-06-17 PROBLEM — I50.23 ACUTE ON CHRONIC SYSTOLIC HEART FAILURE (HCC): Status: RESOLVED | Noted: 2018-08-07 | Resolved: 2021-06-17

## 2021-06-17 PROBLEM — E87.6 HYPOKALEMIA: Status: RESOLVED | Noted: 2018-11-21 | Resolved: 2021-06-17

## 2021-06-17 PROBLEM — R79.89 AZOTEMIA: Status: RESOLVED | Noted: 2018-11-21 | Resolved: 2021-06-17

## 2021-06-17 PROBLEM — K92.2 GASTROINTESTINAL HEMORRHAGE, UNSPECIFIED GASTROINTESTINAL HEMORRHAGE TYPE: Status: RESOLVED | Noted: 2018-11-21 | Resolved: 2021-06-17

## 2021-06-17 PROBLEM — J96.01 ACUTE RESPIRATORY FAILURE WITH HYPOXIA (HCC): Status: RESOLVED | Noted: 2018-07-26 | Resolved: 2021-06-17

## 2021-06-17 PROBLEM — K31.9 GASTROPATHY: Status: RESOLVED | Noted: 2018-12-26 | Resolved: 2021-06-17

## 2021-06-17 PROCEDURE — 85025 COMPLETE CBC W/AUTO DIFF WBC: CPT

## 2021-06-17 PROCEDURE — G0009 ADMIN PNEUMOCOCCAL VACCINE: HCPCS | Performed by: FAMILY MEDICINE

## 2021-06-17 PROCEDURE — 83036 HEMOGLOBIN GLYCOSYLATED A1C: CPT

## 2021-06-17 PROCEDURE — 80061 LIPID PANEL: CPT

## 2021-06-17 PROCEDURE — G0439 PPPS, SUBSEQ VISIT: HCPCS | Performed by: FAMILY MEDICINE

## 2021-06-17 PROCEDURE — 81015 MICROSCOPIC EXAM OF URINE: CPT | Performed by: FAMILY MEDICINE

## 2021-06-17 PROCEDURE — 81001 URINALYSIS AUTO W/SCOPE: CPT | Performed by: FAMILY MEDICINE

## 2021-06-17 PROCEDURE — 82306 VITAMIN D 25 HYDROXY: CPT | Performed by: FAMILY MEDICINE

## 2021-06-17 PROCEDURE — 84443 ASSAY THYROID STIM HORMONE: CPT

## 2021-06-17 PROCEDURE — 36415 COLL VENOUS BLD VENIPUNCTURE: CPT | Performed by: FAMILY MEDICINE

## 2021-06-17 PROCEDURE — 90670 PCV13 VACCINE IM: CPT | Performed by: FAMILY MEDICINE

## 2021-06-17 PROCEDURE — 80053 COMPREHEN METABOLIC PANEL: CPT

## 2021-06-17 RX ORDER — CLOPIDOGREL BISULFATE 75 MG/1
TABLET ORAL
COMMUNITY
Start: 2021-05-18

## 2021-06-17 NOTE — PROGRESS NOTES
HPI:   Onelia Nails is a 77year old female who presents for a Medicare Subsequent Annual Wellness visit (Pt already had Initial Annual Wellness).     Onelia Nails is a 77year old female present today for a routine periodic health gynecological scree Advanced Directive:  She does NOT have a Living Will on file in [place].    Advance care planning including the explanation and discussion of advance directives standard forms performed Face to Face with patient and Family/surrogate (if present), and forms a 01/21/2021    CREATSERUM 0.87 01/21/2021    GLU 92 01/21/2021        CBC  (most recent labs)   Lab Results   Component Value Date    WBC 4.3 03/23/2021    HGB 12.0 03/23/2021    .0 03/23/2021        ALLERGIES:   She has No Known Allergies.     Crissy Wadsworth Musculoskeletal: Positive for arthralgias. Skin: Negative for rash. Neurological: Negative for dizziness and headaches. Psychiatric/Behavioral: Negative for dysphoric mood and sleep disturbance.           EXAM:   BP (!) 180/82 (BP Location: Right ar well-developed. HENT:      Head: Normocephalic.       Comments: Left parotid mass 7 cm, soft, movable, NT     Right Ear: Hearing, tympanic membrane, ear canal and external ear normal.      Left Ear: Hearing, tympanic membrane, ear canal and external ear n older (71825) 11/23/2018   • Pneumovax 23 10/16/2019        ASSESSMENT AND OTHER RELEVANT CHRONIC CONDITIONS:   Victor Hugo Reid is a 77year old female who presents for a Medicare Assessment.      PLAN SUMMARY:   There are no diagnoses linked to this encoun as pregnancy. Contraception option chosen by patient was NA.  REFUSALS:  Although recommended, the patient refuses the following: - . FOLLOW-UP: Schedule a follow-up visit in 12 months.         2. Essential hypertension  Doing okay  CPM  Follow up 4 but may not be covered, or covered at this frequency, by your insurer. Please check with your insurance carrier before scheduling to verify coverage.    PREVENTATIVE SERVICES FREQUENCY &  COVERAGE DETAILS LAST COMPLETION DATE   Diabetes Screening    Fasti if at high risk -  No recommendations at this time    Chlamydia Annually if high risk -  No recommendations at this time   Screening Mammogram    Mammogram     Recommend annually for all female patients aged 36 and older    One baseline mammogram covered f

## 2021-06-18 RX ORDER — ERGOCALCIFEROL 1.25 MG/1
50000 CAPSULE ORAL WEEKLY
Qty: 12 CAPSULE | Refills: 4 | Status: SHIPPED | OUTPATIENT
Start: 2021-06-18 | End: 2021-07-18

## 2021-06-28 RX ORDER — ATORVASTATIN CALCIUM 40 MG/1
TABLET, FILM COATED ORAL
Qty: 90 TABLET | Refills: 0 | Status: SHIPPED | OUTPATIENT
Start: 2021-06-28

## 2021-07-12 RX ORDER — FUROSEMIDE 20 MG/1
TABLET ORAL
Qty: 30 TABLET | Refills: 0 | Status: SHIPPED | OUTPATIENT
Start: 2021-07-12 | End: 2021-08-02

## 2021-07-20 ENCOUNTER — OFFICE VISIT (OUTPATIENT)
Dept: OTOLARYNGOLOGY | Facility: CLINIC | Age: 76
End: 2021-07-20
Payer: MEDICARE

## 2021-07-20 VITALS
DIASTOLIC BLOOD PRESSURE: 77 MMHG | WEIGHT: 118 LBS | TEMPERATURE: 98 F | BODY MASS INDEX: 23.79 KG/M2 | SYSTOLIC BLOOD PRESSURE: 215 MMHG | HEIGHT: 59 IN

## 2021-07-20 DIAGNOSIS — K11.8 PAROTID MASS: Primary | ICD-10-CM

## 2021-07-20 PROCEDURE — 99203 OFFICE O/P NEW LOW 30 MIN: CPT | Performed by: OTOLARYNGOLOGY

## 2021-07-20 RX ORDER — VITAMIN B COMPLEX
TABLET ORAL DAILY
COMMUNITY
Start: 2021-06-17

## 2021-07-20 NOTE — PROGRESS NOTES
Jj Portillo is a 77year old female. Patient presents with:  Tumor: pt presents today due to tumor on the right side of the upper neck by jaw line.       HISTORY OF PRESENT ILLNESS  She presents with several year history of small mass of the left face pain and diarrhea. Endocrine Negative Cold intolerance and heat intolerance. Neuro Negative Tremors. Psych Negative Anxiety and depression. Integumentary Negative Frequent skin infections, pigment change and rash.    Hema/Lymph Negative Easy bleedin Clopidogrel Bisulfate 75 MG Oral Tab, Take 1 tablet by mouth once daily, Disp: , Rfl:   •  lisinopril 40 MG Oral Tab, Take 1 tablet (40 mg total) by mouth daily. , Disp: 90 tablet, Rfl: 1  •  atorvastatin 40 MG Oral Tab, Take 40 mg by mouth daily. , Disp: ,

## 2021-07-28 ENCOUNTER — HOSPITAL ENCOUNTER (OUTPATIENT)
Dept: CT IMAGING | Age: 76
Discharge: HOME | End: 2021-07-28
Attending: OTOLARYNGOLOGY
Payer: MEDICARE

## 2021-07-28 DIAGNOSIS — K11.8 PAROTID MASS: ICD-10-CM

## 2021-07-28 PROCEDURE — 70491 CT SOFT TISSUE NECK W/DYE: CPT | Performed by: OTOLARYNGOLOGY

## 2021-08-02 RX ORDER — FUROSEMIDE 20 MG/1
20 TABLET ORAL DAILY
Qty: 90 TABLET | Refills: 0 | Status: SHIPPED | OUTPATIENT
Start: 2021-08-02 | End: 2021-11-15

## 2021-08-02 NOTE — TELEPHONE ENCOUNTER
Refill passed per [email or web address] protocol.      Requested Prescriptions   Pending Prescriptions Disp Refills    FUROSEMIDE 20 MG Oral Tab [Pharmacy Med Name: Furosemide 20 MG Oral Tablet] 30 tablet 0     Sig: Take 1 tablet by mouth once daily        Hypertensive Medications Protocol Passed - 8/2/2021  5:30 AM        Passed - CMP or BMP in past 12 months        Passed - Appointment in past 6 or next 3 months        Passed - GFR Non- > 50     Lab Results   Component Value Date    GFRNAA 70 06/17/2021                      Recent Outpatient Visits              1 week ago Parotid mass    [facility], [address], Wanda Sadler MD    Office Visit    1 month ago Medicare annual wellness visit, subsequent    [address], Curtis Abdalla MD    Office Visit    6 months ago Essential hypertension    [facility], [address], Curtis Abdalla MD    Office Visit    1 year ago Essential hypertension    [facility], [facility], Curtis Abdalla MD    Office Visit    2 years ago PUD (peptic ulcer disease)    [facility], [facility], Curtis Abdalla MD    Office Visit             Future Appointments         Provider Department Appt Notes    In 1 week ADO GABRIELLE RM1; ADKWABENA DEXA [address] Neck mass    In 2 weeks Ankita Abdalla MD [facility], [address], [facility]

## 2021-08-10 ENCOUNTER — OFFICE VISIT (OUTPATIENT)
Dept: OTOLARYNGOLOGY | Facility: CLINIC | Age: 76
End: 2021-08-10
Payer: MEDICARE

## 2021-08-10 VITALS — BODY MASS INDEX: 27.21 KG/M2 | WEIGHT: 135 LBS | HEIGHT: 59 IN

## 2021-08-10 DIAGNOSIS — K11.8 PAROTID MASS: Primary | ICD-10-CM

## 2021-08-10 PROCEDURE — 99214 OFFICE O/P EST MOD 30 MIN: CPT | Performed by: OTOLARYNGOLOGY

## 2021-08-10 NOTE — PROGRESS NOTES
Pauline Lipoma is a 77year old female.   Patient presents with:  Results: pt presents today due to ct soft tissue neck results      HISTORY OF PRESENT ILLNESS  She presents with several year history of small mass of the left face just by the earlobe which Location: [facility] ENDOSCOPY         REVIEW OF SYSTEMS    System Neg/Pos Details   Constitutional Negative Fatigue, fever and weight loss. ENMT Negative Drooling. Eyes Negative Blurred vision and vision changes. Respiratory Negative Dyspnea and wheezing. Nares - Right: Normal Left: Normal. Septum -Normal  Turbinates - Right: Normal, Left: Normal.       Current Outpatient Medications:   •  furosemide 20 MG Oral Tab, Take 1 tablet (20 mg total) by mouth daily. , Disp: 90 tablet, Rfl: 0  •  Cholecalciferol (VI corrected. While every attempt is made to correct errors during dictation discrepancies may still exist    Gil Sosa MD    8/10/2021    6:30 PM

## 2021-08-12 ENCOUNTER — HOSPITAL ENCOUNTER (OUTPATIENT)
Dept: BONE DENSITY | Age: 76
Discharge: HOME | End: 2021-08-12
Attending: FAMILY MEDICINE
Payer: MEDICARE

## 2021-08-12 DIAGNOSIS — M85.89 OSTEOPENIA OF MULTIPLE SITES: ICD-10-CM

## 2021-08-12 PROCEDURE — 77080 DXA BONE DENSITY AXIAL: CPT | Performed by: FAMILY MEDICINE

## 2021-08-17 ENCOUNTER — OFFICE VISIT (OUTPATIENT)
Dept: FAMILY MEDICINE CLINIC | Facility: CLINIC | Age: 76
End: 2021-08-17
Payer: MEDICARE

## 2021-08-17 VITALS
WEIGHT: 119 LBS | BODY MASS INDEX: 23.99 KG/M2 | DIASTOLIC BLOOD PRESSURE: 86 MMHG | SYSTOLIC BLOOD PRESSURE: 180 MMHG | HEART RATE: 62 BPM | HEIGHT: 59 IN

## 2021-08-17 DIAGNOSIS — M81.0 AGE-RELATED OSTEOPOROSIS WITHOUT CURRENT PATHOLOGICAL FRACTURE: Primary | ICD-10-CM

## 2021-08-17 DIAGNOSIS — D49.0 PAROTID TUMOR: ICD-10-CM

## 2021-08-17 PROCEDURE — 99213 OFFICE O/P EST LOW 20 MIN: CPT | Performed by: FAMILY MEDICINE

## 2021-08-17 RX ORDER — ALENDRONATE SODIUM 70 MG/1
70 TABLET ORAL WEEKLY
Qty: 12 TABLET | Refills: 3 | Status: SHIPPED | OUTPATIENT
Start: 2021-08-17

## 2021-08-17 NOTE — PROGRESS NOTES
8/17/2021  8:52 AM    Syd Matos is a 77year old female. Chief complaint(s): Patient presents with:  Lab Results: Dexa scan    HPI:     Syd Matos primary complaint is regarding osteoporosis.      Patient is a 71-year-old female who recently u older ([id number])                          11/23/2018      Fluzone Vaccine Medicare ()                          10/16/2019      Pneumococcal (Prevnar 13)                          06/17/2021      Pneumovax 23          10/16/2019      Medications (Active pr Appearance: Normal appearance. HENT:      Head: Normocephalic. Eyes:      Conjunctiva/sclera: Conjunctivae normal.   Cardiovascular:      Rate and Rhythm: Normal rate.    Pulmonary:      Effort: Pulmonary effort is normal.   Musculoskeletal:      Cervic lobe.  Lesion demonstrates coarse central calcification. Right parotid and submandibular glands demonstrate no significant abnormality.   Heterogeneous low-density right greater than left thyroid nodules, the largest of which measures up to 1.4 cm in the p greater than left thyroid nodules, the largest of which measures up to 1.4 cm. 6. Incidentally noted chronic lacunar infarct involving the right basal nuclei. 7. Bilateral carotid bifurcation as well as intracranial atherosclerosis.  8. Lesser incidental fi algorithm         CONCLUSION:  1. Mild osteoporosis total left hip and moderate osteoporosis left femoral neck. Severe osteopenia lumbar spine. 2. 15% 10 year risk for major osteoporotic fracture. 6.5% 10 year risk for hip fracture.     Dictated by (CST):

## 2021-08-18 ENCOUNTER — TELEPHONE (OUTPATIENT)
Dept: OTOLARYNGOLOGY | Facility: CLINIC | Age: 76
End: 2021-08-18

## 2021-08-23 ENCOUNTER — PATIENT MESSAGE (OUTPATIENT)
Dept: OTOLARYNGOLOGY | Facility: CLINIC | Age: 76
End: 2021-08-23

## 2021-08-24 RX ORDER — CARVEDILOL 6.25 MG/1
TABLET ORAL
Qty: 180 TABLET | Refills: 0 | Status: SHIPPED | OUTPATIENT
Start: 2021-08-24 | End: 2021-11-29

## 2021-08-25 NOTE — TELEPHONE ENCOUNTER
Patient is scheduled for surgery on 9/13/21 wit . Advised patient with verbal understanding that Medical clearance from Dr. Camila Magaña is required and permission to stop taking Plavix 5 days prior to surgery.  Covid testing is required 72 hours prior

## 2021-09-01 DIAGNOSIS — K11.8 PAROTID MASS: Primary | ICD-10-CM

## 2021-09-09 ENCOUNTER — TELEPHONE (OUTPATIENT)
Dept: OTOLARYNGOLOGY | Facility: CLINIC | Age: 76
End: 2021-09-09

## 2021-09-09 ENCOUNTER — TELEPHONE (OUTPATIENT)
Dept: FAMILY MEDICINE CLINIC | Facility: CLINIC | Age: 76
End: 2021-09-09

## 2021-09-09 NOTE — TELEPHONE ENCOUNTER
Please assist with scheduling pre-op exam appointment with Dr. Shirlene Chew. Patient is scheduled for surgery on 09/13 with Dr. Pavithra Chowdhury.

## 2021-09-09 NOTE — TELEPHONE ENCOUNTER
Contacted patient to inform Dr. Donald Villarreal is requesting to see her in the office for pre-op clearance. Daughter pretended to be patient, after explaining that patient had to be seen for pre-op clearance, she sounded confused and admitted to be the daughter.

## 2021-09-09 NOTE — TELEPHONE ENCOUNTER
Pt daughter calling very frustrated and expressing concern about pt having to come in for pre op.  She states they should have been told at last visit that pt would need a pre op visit and she states pt already stopped takiing her meds for surgery schedule

## 2021-09-09 NOTE — TELEPHONE ENCOUNTER
Spoke to patient daughter Cristopher Christianson (same name as her mother) Called to verify if Cristopher Christianson has had medical clearance from [address] recently.  Informed patient daughter as per our last conversation Carmel needed to be seen by  [facility] regarding stopping Pl

## 2021-09-10 ENCOUNTER — LAB ENCOUNTER (OUTPATIENT)
Dept: LAB | Age: 76
End: 2021-09-10
Attending: OTOLARYNGOLOGY
Payer: MEDICARE

## 2021-09-10 DIAGNOSIS — Z01.818 PRE-OP TESTING: ICD-10-CM

## 2021-09-10 LAB — SARS-COV-2 RNA RESP QL NAA+PROBE: NOT DETECTED

## 2021-09-11 ENCOUNTER — LAB ENCOUNTER (OUTPATIENT)
Dept: LAB | Age: 76
End: 2021-09-11
Attending: FAMILY MEDICINE
Payer: MEDICARE

## 2021-09-11 ENCOUNTER — OFFICE VISIT (OUTPATIENT)
Dept: FAMILY MEDICINE CLINIC | Facility: CLINIC | Age: 76
End: 2021-09-11
Payer: MEDICARE

## 2021-09-11 ENCOUNTER — TELEPHONE (OUTPATIENT)
Dept: OTOLARYNGOLOGY | Facility: CLINIC | Age: 76
End: 2021-09-11

## 2021-09-11 VITALS
HEIGHT: 59 IN | WEIGHT: 123.13 LBS | TEMPERATURE: 98 F | SYSTOLIC BLOOD PRESSURE: 192 MMHG | BODY MASS INDEX: 24.82 KG/M2 | DIASTOLIC BLOOD PRESSURE: 70 MMHG | HEART RATE: 59 BPM

## 2021-09-11 DIAGNOSIS — I10 ESSENTIAL HYPERTENSION: ICD-10-CM

## 2021-09-11 DIAGNOSIS — Z01.818 PREOPERATIVE CLEARANCE: ICD-10-CM

## 2021-09-11 DIAGNOSIS — Z01.818 PREOPERATIVE CLEARANCE: Primary | ICD-10-CM

## 2021-09-11 DIAGNOSIS — D49.0 PAROTID TUMOR: ICD-10-CM

## 2021-09-11 DIAGNOSIS — R94.31 ABNORMAL EKG: ICD-10-CM

## 2021-09-11 LAB
ALBUMIN SERPL-MCNC: 4.2 G/DL (ref 3.4–5)
ALBUMIN/GLOB SERPL: 1.2 {RATIO} (ref 1–2)
ALP LIVER SERPL-CCNC: 79 U/L
ALT SERPL-CCNC: 24 U/L
ANION GAP SERPL CALC-SCNC: 7 MMOL/L (ref 0–18)
APTT PPP: 31.7 SECONDS (ref 23.2–35.3)
AST SERPL-CCNC: 24 U/L (ref 15–37)
BASOPHILS # BLD AUTO: 0.03 X10(3) UL (ref 0–0.2)
BASOPHILS NFR BLD AUTO: 0.6 %
BILIRUB SERPL-MCNC: 0.8 MG/DL (ref 0.1–2)
BUN BLD-MCNC: 13 MG/DL (ref 7–18)
BUN/CREAT SERPL: 16 (ref 10–20)
CALCIUM BLD-MCNC: 9.5 MG/DL (ref 8.5–10.1)
CHLORIDE SERPL-SCNC: 109 MMOL/L (ref 98–112)
CO2 SERPL-SCNC: 26 MMOL/L (ref 21–32)
CREAT BLD-MCNC: 0.81 MG/DL
DEPRECATED RDW RBC AUTO: 42.9 FL (ref 35.1–46.3)
EOSINOPHIL # BLD AUTO: 0.27 X10(3) UL (ref 0–0.7)
EOSINOPHIL NFR BLD AUTO: 5 %
ERYTHROCYTE [DISTWIDTH] IN BLOOD BY AUTOMATED COUNT: 11.9 % (ref 11–15)
GLOBULIN PLAS-MCNC: 3.6 G/DL (ref 2.8–4.4)
GLUCOSE BLD-MCNC: 104 MG/DL (ref 70–99)
HCT VFR BLD AUTO: 38.2 %
HGB BLD-MCNC: 12.5 G/DL
IMM GRANULOCYTES # BLD AUTO: 0.01 X10(3) UL (ref 0–1)
IMM GRANULOCYTES NFR BLD: 0.2 %
INR BLD: 1.05 (ref 0.9–1.2)
LYMPHOCYTES # BLD AUTO: 1.71 X10(3) UL (ref 1–4)
LYMPHOCYTES NFR BLD AUTO: 31.5 %
M PROTEIN MFR SERPL ELPH: 7.8 G/DL (ref 6.4–8.2)
MCH RBC QN AUTO: 31.8 PG (ref 26–34)
MCHC RBC AUTO-ENTMCNC: 32.7 G/DL (ref 31–37)
MCV RBC AUTO: 97.2 FL
MONOCYTES # BLD AUTO: 0.32 X10(3) UL (ref 0.1–1)
MONOCYTES NFR BLD AUTO: 5.9 %
NEUTROPHILS # BLD AUTO: 3.08 X10 (3) UL (ref 1.5–7.7)
NEUTROPHILS # BLD AUTO: 3.08 X10(3) UL (ref 1.5–7.7)
NEUTROPHILS NFR BLD AUTO: 56.8 %
OSMOLALITY SERPL CALC.SUM OF ELEC: 294 MOSM/KG (ref 275–295)
PATIENT FASTING Y/N/NP: YES
PLATELET # BLD AUTO: 221 10(3)UL (ref 150–450)
POTASSIUM SERPL-SCNC: 4.4 MMOL/L (ref 3.5–5.1)
PROTHROMBIN TIME: 13.5 SECONDS (ref 11.8–14.5)
RBC # BLD AUTO: 3.93 X10(6)UL
SODIUM SERPL-SCNC: 142 MMOL/L (ref 136–145)
WBC # BLD AUTO: 5.4 X10(3) UL (ref 4–11)

## 2021-09-11 PROCEDURE — 85610 PROTHROMBIN TIME: CPT

## 2021-09-11 PROCEDURE — 85730 THROMBOPLASTIN TIME PARTIAL: CPT

## 2021-09-11 PROCEDURE — 93000 ELECTROCARDIOGRAM COMPLETE: CPT | Performed by: FAMILY MEDICINE

## 2021-09-11 PROCEDURE — 36415 COLL VENOUS BLD VENIPUNCTURE: CPT

## 2021-09-11 PROCEDURE — 80053 COMPREHEN METABOLIC PANEL: CPT

## 2021-09-11 PROCEDURE — 99214 OFFICE O/P EST MOD 30 MIN: CPT | Performed by: FAMILY MEDICINE

## 2021-09-11 PROCEDURE — 85025 COMPLETE CBC W/AUTO DIFF WBC: CPT

## 2021-09-11 NOTE — TELEPHONE ENCOUNTER
LMTCB- per pt it will need to be canceled , per PCP abnormal EKG referred to cardiologist and high b/p

## 2021-09-11 NOTE — PROGRESS NOTES
9/11/2021  8:58 AM    Brain Ruiz is a 77year old female. Chief complaint(s): Patient presents with:  Pre-Op Exam: Surgery on 09/13/21 with  for a Parotid mass    HPI:     Brain Ruiz primary complaint is regarding as above.      Kevyn Pneumococcal (Prevnar 13)                          06/17/2021      Pneumovax 23          10/16/2019      Medications (Active prior to today's visit):  Current Outpatient Medications   Medication Sig Dispense Refill   • CARVEDILOL 6.25 MG Oral Tab TAKE 1 TA Heart sounds: Normal heart sounds, S1 normal and S2 normal.   Pulmonary:      Effort: Pulmonary effort is normal.      Breath sounds: Normal breath sounds. Musculoskeletal:      Cervical back: Neck supple. Skin:     Findings: No rash.    Psychiatric: [E]      PTT, Activated [E]      REFERRALS: ELECTROCARDIOGRAM, COMPLETE  ELECTROCARDIOGRAM, COMPLETE  CARDIO - INTERNAL, Orders Placed This Encounter         Cardio Referral - In Network       RECOMMENDATIONS given include: Patient was reassured of  her me

## 2021-09-13 ENCOUNTER — TELEPHONE (OUTPATIENT)
Dept: CARDIOLOGY | Age: 76
End: 2021-09-13

## 2021-09-20 ENCOUNTER — TELEPHONE (OUTPATIENT)
Dept: OTOLARYNGOLOGY | Facility: CLINIC | Age: 76
End: 2021-09-20

## 2021-09-20 NOTE — TELEPHONE ENCOUNTER
Pt's son calling reschedule surgery. Alyssa Monzon works 2nd shift and would like to be called back tomorrow morning so that he does not miss call.  Please advise

## 2021-09-22 NOTE — TELEPHONE ENCOUNTER
Spoke to daughter Roxi Lisa. Asked daughter is patient has received clearance for surgery by Cardiologist, per pt daughter she does not know. Advised with verbal understanding to call us once patient is cleared for surgery.

## 2021-09-23 NOTE — TELEPHONE ENCOUNTER
Contacted  office and requested Medical clearance [phone], and they will fax over clearance and medication instructions

## 2021-09-23 NOTE — TELEPHONE ENCOUNTER
Per pt Dr. Katrin Monaco office is waiting for a medical clearance request form.  Please fax to [phone]

## 2021-09-24 NOTE — TELEPHONE ENCOUNTER
Received medical clearance from [facility] office, gave it to Dr. Lagos for review.

## 2021-09-27 ENCOUNTER — TELEPHONE (OUTPATIENT)
Dept: FAMILY MEDICINE CLINIC | Facility: CLINIC | Age: 76
End: 2021-09-27

## 2021-09-27 NOTE — TELEPHONE ENCOUNTER
Advised patient with verbal understating that per  request Medical Clearance letters from Cardiologist and by Dr. José Antonio Sesay in a timely manner to proceed with Surgery on 10/11/21.

## 2021-09-27 NOTE — TELEPHONE ENCOUNTER
pt. is requesting to get surgery clearance faxed to Dr Jas Simon. - Fax # [phone]. Pt. States that her surgery date may be sched for 10/11/2021.

## 2021-09-27 NOTE — TELEPHONE ENCOUNTER
Patients daughter Isaura Campuzano calling for update on form, please call at [phone],UMODESTOWLEÓN.

## 2021-09-28 ENCOUNTER — OFFICE VISIT (OUTPATIENT)
Dept: FAMILY MEDICINE CLINIC | Facility: CLINIC | Age: 76
End: 2021-09-28
Payer: MEDICARE

## 2021-09-28 ENCOUNTER — TELEPHONE (OUTPATIENT)
Dept: FAMILY MEDICINE CLINIC | Facility: CLINIC | Age: 76
End: 2021-09-28

## 2021-09-28 VITALS
HEART RATE: 52 BPM | BODY MASS INDEX: 24.6 KG/M2 | TEMPERATURE: 98 F | SYSTOLIC BLOOD PRESSURE: 160 MMHG | DIASTOLIC BLOOD PRESSURE: 78 MMHG | WEIGHT: 122 LBS | HEIGHT: 59 IN

## 2021-09-28 DIAGNOSIS — Z01.818 PREOPERATIVE CLEARANCE: Primary | ICD-10-CM

## 2021-09-28 DIAGNOSIS — D49.0 PAROTID TUMOR: ICD-10-CM

## 2021-09-28 DIAGNOSIS — I10 ESSENTIAL HYPERTENSION: ICD-10-CM

## 2021-09-28 PROCEDURE — G0008 ADMIN INFLUENZA VIRUS VAC: HCPCS | Performed by: FAMILY MEDICINE

## 2021-09-28 PROCEDURE — 99213 OFFICE O/P EST LOW 20 MIN: CPT | Performed by: FAMILY MEDICINE

## 2021-09-28 PROCEDURE — 90662 IIV NO PRSV INCREASED AG IM: CPT | Performed by: FAMILY MEDICINE

## 2021-09-28 RX ORDER — NIFEDIPINE 30 MG/1
30 TABLET, FILM COATED, EXTENDED RELEASE ORAL DAILY
Qty: 30 TABLET | Refills: 3 | Status: SHIPPED | OUTPATIENT
Start: 2021-09-28 | End: 2022-01-17

## 2021-09-28 NOTE — TELEPHONE ENCOUNTER
Faxed progress notes from  to Dr. Mary Ortiz office [phone] with confirmation    I spoke with patient she verified name and  and Is advised of fax.

## 2021-10-05 DIAGNOSIS — D35.1 PARATHYROID ADENOMA: Primary | ICD-10-CM

## 2021-10-13 ENCOUNTER — OFFICE VISIT (OUTPATIENT)
Dept: FAMILY MEDICINE CLINIC | Facility: CLINIC | Age: 76
End: 2021-10-13
Payer: MEDICARE

## 2021-10-13 VITALS
BODY MASS INDEX: 24.19 KG/M2 | HEART RATE: 62 BPM | SYSTOLIC BLOOD PRESSURE: 139 MMHG | WEIGHT: 120 LBS | HEIGHT: 59 IN | DIASTOLIC BLOOD PRESSURE: 70 MMHG

## 2021-10-13 DIAGNOSIS — D49.0 PAROTID TUMOR: ICD-10-CM

## 2021-10-13 DIAGNOSIS — I10 ESSENTIAL HYPERTENSION: Primary | ICD-10-CM

## 2021-10-13 PROCEDURE — 99213 OFFICE O/P EST LOW 20 MIN: CPT | Performed by: FAMILY MEDICINE

## 2021-10-13 NOTE — PROGRESS NOTES
10/13/2021  8:50 AM    Maria De Jesus Wei is a 77year old female. Chief complaint(s): Patient presents with:  Blood Pressure: Follow up     HPI:     Maria De Jesus Wei primary complaint is regarding HTN.      Ivon Bean a 77year old female presents wi 10/16/2019      Pneumococcal (Prevnar 13)                          06/17/2021      Pneumovax 23          10/16/2019      Medications (Active prior to today's visit):  Current Outpatient Medications   Medication Sig Dispense Refill   • NIFEdipine (NIFEDIAC Cardiovascular:      Rate and Rhythm: Normal rate and regular rhythm. Pulmonary:      Effort: Pulmonary effort is normal.   Musculoskeletal:      Cervical back: Neck supple. Skin:     Findings: No rash.    Psychiatric:         Mood and Affect: Mood no

## 2021-10-18 DIAGNOSIS — D11.0 BENIGN TUMOR OF PAROTID GLAND: Primary | ICD-10-CM

## 2021-10-19 ENCOUNTER — LAB ENCOUNTER (OUTPATIENT)
Dept: LAB | Facility: HOSPITAL | Age: 76
End: 2021-10-19
Attending: OTOLARYNGOLOGY
Payer: MEDICARE

## 2021-10-19 DIAGNOSIS — Z01.818 PREOP TESTING: ICD-10-CM

## 2021-10-20 ENCOUNTER — ANESTHESIA (OUTPATIENT)
Dept: SURGERY | Facility: HOSPITAL | Age: 76
End: 2021-10-20
Payer: MEDICARE

## 2021-10-20 ENCOUNTER — ANESTHESIA EVENT (OUTPATIENT)
Dept: SURGERY | Facility: HOSPITAL | Age: 76
End: 2021-10-20
Payer: MEDICARE

## 2021-10-20 ENCOUNTER — HOSPITAL ENCOUNTER (OUTPATIENT)
Facility: HOSPITAL | Age: 76
Setting detail: HOSPITAL OUTPATIENT SURGERY
Discharge: HOME | End: 2021-10-20
Attending: OTOLARYNGOLOGY | Admitting: OTOLARYNGOLOGY
Payer: MEDICARE

## 2021-10-20 VITALS
BODY MASS INDEX: 24.13 KG/M2 | WEIGHT: 119.69 LBS | OXYGEN SATURATION: 97 % | HEIGHT: 59 IN | DIASTOLIC BLOOD PRESSURE: 57 MMHG | HEART RATE: 69 BPM | RESPIRATION RATE: 14 BRPM | TEMPERATURE: 97 F | SYSTOLIC BLOOD PRESSURE: 136 MMHG

## 2021-10-20 DIAGNOSIS — Z01.818 PREOP TESTING: Primary | ICD-10-CM

## 2021-10-20 DIAGNOSIS — D11.0 BENIGN TUMOR OF PAROTID GLAND: ICD-10-CM

## 2021-10-20 PROBLEM — K11.8 PAROTID MASS: Status: ACTIVE | Noted: 2021-10-20

## 2021-10-20 PROBLEM — K11.8 PAROTID MASS: Status: RESOLVED | Noted: 2021-10-20 | Resolved: 2021-10-20

## 2021-10-20 PROCEDURE — 42420 EXCISE PAROTID GLAND/LESION: CPT | Performed by: OTOLARYNGOLOGY

## 2021-10-20 PROCEDURE — 0CB90ZZ EXCISION OF LEFT PAROTID GLAND, OPEN APPROACH: ICD-10-PCS | Performed by: OTOLARYNGOLOGY

## 2021-10-20 RX ORDER — ONDANSETRON 2 MG/ML
4 INJECTION INTRAMUSCULAR; INTRAVENOUS ONCE AS NEEDED
Status: DISCONTINUED | OUTPATIENT
Start: 2021-10-20 | End: 2021-10-20

## 2021-10-20 RX ORDER — HYDROMORPHONE HYDROCHLORIDE 1 MG/ML
0.4 INJECTION, SOLUTION INTRAMUSCULAR; INTRAVENOUS; SUBCUTANEOUS EVERY 2 HOUR PRN
Status: CANCELLED | OUTPATIENT
Start: 2021-10-20

## 2021-10-20 RX ORDER — HYDROMORPHONE HYDROCHLORIDE 1 MG/ML
0.2 INJECTION, SOLUTION INTRAMUSCULAR; INTRAVENOUS; SUBCUTANEOUS EVERY 5 MIN PRN
Status: DISCONTINUED | OUTPATIENT
Start: 2021-10-20 | End: 2021-10-20

## 2021-10-20 RX ORDER — SODIUM CHLORIDE, SODIUM LACTATE, POTASSIUM CHLORIDE, CALCIUM CHLORIDE 600; 310; 30; 20 MG/100ML; MG/100ML; MG/100ML; MG/100ML
INJECTION, SOLUTION INTRAVENOUS CONTINUOUS
Status: DISCONTINUED | OUTPATIENT
Start: 2021-10-20 | End: 2021-10-20

## 2021-10-20 RX ORDER — MIDAZOLAM HYDROCHLORIDE 1 MG/ML
INJECTION INTRAMUSCULAR; INTRAVENOUS AS NEEDED
Status: DISCONTINUED | OUTPATIENT
Start: 2021-10-20 | End: 2021-10-20 | Stop reason: SURG

## 2021-10-20 RX ORDER — OXYCODONE HYDROCHLORIDE 5 MG/1
5 TABLET ORAL EVERY 4 HOURS PRN
Status: CANCELLED | OUTPATIENT
Start: 2021-10-20

## 2021-10-20 RX ORDER — SODIUM CHLORIDE 0.9 % (FLUSH) 0.9 %
10 SYRINGE (ML) INJECTION AS NEEDED
Status: CANCELLED | OUTPATIENT
Start: 2021-10-20

## 2021-10-20 RX ORDER — EPHEDRINE SULFATE 50 MG/ML
INJECTION, SOLUTION INTRAVENOUS AS NEEDED
Status: DISCONTINUED | OUTPATIENT
Start: 2021-10-20 | End: 2021-10-20 | Stop reason: SURG

## 2021-10-20 RX ORDER — MORPHINE SULFATE 4 MG/ML
4 INJECTION, SOLUTION INTRAMUSCULAR; INTRAVENOUS EVERY 10 MIN PRN
Status: DISCONTINUED | OUTPATIENT
Start: 2021-10-20 | End: 2021-10-20

## 2021-10-20 RX ORDER — ONDANSETRON 2 MG/ML
4 INJECTION INTRAMUSCULAR; INTRAVENOUS EVERY 6 HOURS PRN
Status: CANCELLED | OUTPATIENT
Start: 2021-10-20

## 2021-10-20 RX ORDER — NALOXONE HYDROCHLORIDE 0.4 MG/ML
80 INJECTION, SOLUTION INTRAMUSCULAR; INTRAVENOUS; SUBCUTANEOUS AS NEEDED
Status: DISCONTINUED | OUTPATIENT
Start: 2021-10-20 | End: 2021-10-20

## 2021-10-20 RX ORDER — MORPHINE SULFATE 10 MG/ML
6 INJECTION, SOLUTION INTRAMUSCULAR; INTRAVENOUS EVERY 10 MIN PRN
Status: DISCONTINUED | OUTPATIENT
Start: 2021-10-20 | End: 2021-10-20

## 2021-10-20 RX ORDER — METOPROLOL TARTRATE 5 MG/5ML
2.5 INJECTION INTRAVENOUS ONCE
Status: DISCONTINUED | OUTPATIENT
Start: 2021-10-20 | End: 2021-10-20

## 2021-10-20 RX ORDER — SODIUM CHLORIDE, SODIUM LACTATE, POTASSIUM CHLORIDE, CALCIUM CHLORIDE 600; 310; 30; 20 MG/100ML; MG/100ML; MG/100ML; MG/100ML
INJECTION, SOLUTION INTRAVENOUS CONTINUOUS
Status: CANCELLED | OUTPATIENT
Start: 2021-10-20

## 2021-10-20 RX ORDER — PROCHLORPERAZINE EDISYLATE 5 MG/ML
5 INJECTION INTRAMUSCULAR; INTRAVENOUS ONCE AS NEEDED
Status: DISCONTINUED | OUTPATIENT
Start: 2021-10-20 | End: 2021-10-20

## 2021-10-20 RX ORDER — HYDROCODONE BITARTRATE AND ACETAMINOPHEN 5; 325 MG/1; MG/1
1 TABLET ORAL AS NEEDED
Status: DISCONTINUED | OUTPATIENT
Start: 2021-10-20 | End: 2021-10-20

## 2021-10-20 RX ORDER — HYDROMORPHONE HYDROCHLORIDE 1 MG/ML
0.4 INJECTION, SOLUTION INTRAMUSCULAR; INTRAVENOUS; SUBCUTANEOUS EVERY 5 MIN PRN
Status: DISCONTINUED | OUTPATIENT
Start: 2021-10-20 | End: 2021-10-20

## 2021-10-20 RX ORDER — ACETAMINOPHEN 325 MG/1
650 TABLET ORAL
Status: CANCELLED | OUTPATIENT
Start: 2021-10-20

## 2021-10-20 RX ORDER — HYDROCODONE BITARTRATE AND ACETAMINOPHEN 5; 325 MG/1; MG/1
2 TABLET ORAL AS NEEDED
Status: DISCONTINUED | OUTPATIENT
Start: 2021-10-20 | End: 2021-10-20

## 2021-10-20 RX ORDER — LIDOCAINE HYDROCHLORIDE 10 MG/ML
INJECTION, SOLUTION EPIDURAL; INFILTRATION; INTRACAUDAL; PERINEURAL AS NEEDED
Status: DISCONTINUED | OUTPATIENT
Start: 2021-10-20 | End: 2021-10-20 | Stop reason: SURG

## 2021-10-20 RX ORDER — HYDROMORPHONE HYDROCHLORIDE 1 MG/ML
0.6 INJECTION, SOLUTION INTRAMUSCULAR; INTRAVENOUS; SUBCUTANEOUS EVERY 5 MIN PRN
Status: DISCONTINUED | OUTPATIENT
Start: 2021-10-20 | End: 2021-10-20

## 2021-10-20 RX ORDER — CEPHALEXIN 500 MG/1
500 CAPSULE ORAL EVERY 8 HOURS
Qty: 21 CAPSULE | Refills: 0 | Status: SHIPPED | OUTPATIENT
Start: 2021-10-20

## 2021-10-20 RX ORDER — MORPHINE SULFATE 4 MG/ML
2 INJECTION, SOLUTION INTRAMUSCULAR; INTRAVENOUS EVERY 10 MIN PRN
Status: DISCONTINUED | OUTPATIENT
Start: 2021-10-20 | End: 2021-10-20

## 2021-10-20 RX ORDER — OXYCODONE HYDROCHLORIDE 5 MG/1
2.5 TABLET ORAL EVERY 4 HOURS PRN
Status: CANCELLED | OUTPATIENT
Start: 2021-10-20

## 2021-10-20 RX ORDER — HYDROMORPHONE HYDROCHLORIDE 1 MG/ML
0.2 INJECTION, SOLUTION INTRAMUSCULAR; INTRAVENOUS; SUBCUTANEOUS EVERY 2 HOUR PRN
Status: CANCELLED | OUTPATIENT
Start: 2021-10-20

## 2021-10-20 RX ORDER — LIDOCAINE HYDROCHLORIDE AND EPINEPHRINE 10; 10 MG/ML; UG/ML
INJECTION, SOLUTION INFILTRATION; PERINEURAL AS NEEDED
Status: DISCONTINUED | OUTPATIENT
Start: 2021-10-20 | End: 2021-10-20 | Stop reason: HOSPADM

## 2021-10-20 RX ORDER — ACETAMINOPHEN 500 MG
1000 TABLET ORAL ONCE
Status: COMPLETED | OUTPATIENT
Start: 2021-10-20 | End: 2021-10-20

## 2021-10-20 RX ORDER — HYDROCODONE BITARTRATE AND ACETAMINOPHEN 5; 325 MG/1; MG/1
1 TABLET ORAL EVERY 6 HOURS PRN
Qty: 30 TABLET | Refills: 0 | Status: SHIPPED | OUTPATIENT
Start: 2021-10-20

## 2021-10-20 RX ORDER — HALOPERIDOL 5 MG/ML
0.25 INJECTION INTRAMUSCULAR ONCE AS NEEDED
Status: DISCONTINUED | OUTPATIENT
Start: 2021-10-20 | End: 2021-10-20

## 2021-10-20 RX ADMIN — SODIUM CHLORIDE, SODIUM LACTATE, POTASSIUM CHLORIDE, CALCIUM CHLORIDE: 600; 310; 30; 20 INJECTION, SOLUTION INTRAVENOUS at 09:22:00

## 2021-10-20 RX ADMIN — MIDAZOLAM HYDROCHLORIDE 2 MG: 1 INJECTION INTRAMUSCULAR; INTRAVENOUS at 08:45:00

## 2021-10-20 RX ADMIN — SODIUM CHLORIDE, SODIUM LACTATE, POTASSIUM CHLORIDE, CALCIUM CHLORIDE: 600; 310; 30; 20 INJECTION, SOLUTION INTRAVENOUS at 08:47:00

## 2021-10-20 RX ADMIN — LIDOCAINE HYDROCHLORIDE 50 MG: 10 INJECTION, SOLUTION EPIDURAL; INFILTRATION; INTRACAUDAL; PERINEURAL at 08:48:00

## 2021-10-20 RX ADMIN — EPHEDRINE SULFATE 10 MG: 50 INJECTION, SOLUTION INTRAVENOUS at 09:19:00

## 2021-10-20 RX ADMIN — EPHEDRINE SULFATE 10 MG: 50 INJECTION, SOLUTION INTRAVENOUS at 09:06:00

## 2021-10-20 RX ADMIN — EPHEDRINE SULFATE 10 MG: 50 INJECTION, SOLUTION INTRAVENOUS at 09:25:00

## 2021-10-20 NOTE — INTERVAL H&P NOTE
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 Sam Jensen MD on 10/20/2021, the patient was examined and no significant changes have occurred in the patient's condition since the H&P was performed.

## 2021-10-20 NOTE — ANESTHESIA POSTPROCEDURE EVALUATION
Patient: Amalia Cheema    Procedure Summary     Date: 10/20/21 Room / Location: [address] [place] [address] MAIN OR 03 / [address] [facility] MAIN OR    Anesthesia Start: 0845 Anesthesia Stop:     Procedure: TOTAL PAROTIDECTOMY (Left Neck) Diagnosis:       Benign tumor of parotid gland      (Be

## 2021-10-20 NOTE — H&P
HISTORY OF PRESENT ILLNESS  She presents with several year history of small mass of the left face just by the earlobe which she states has grown significantly in the past year. Kathleen Josephlindas is here with her son who states that they never really noticed anything up N/A 11/23/2018     Procedure: COLONOSCOPY;  Surgeon: Kelsie Felix MD;  Location: [address] [place] [address] ENDOSCOPY            REVIEW OF SYSTEMS     System Neg/Pos Details   Constitutional Negative Fatigue, fever and weight loss. ENMT Negative Drooling.    Eyes nose - Normal. Lips/teeth/gums - Normal. Tonsils - Normal. Oropharynx - Normal.   Nose/Mouth/Throat Normal Nares - Right: Normal Left: Normal. Septum -Normal  Turbinates - Right: Normal, Left: Normal.         Current Outpatient Medications:   •  furosemide Medical voice recognition dictation software. As a result errors may occur. When identified these errors have been corrected. While every attempt is made to correct errors during dictation discrepancies may still exist     Gil Lagos MD

## 2021-10-20 NOTE — BRIEF OP NOTE
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

## 2021-10-20 NOTE — ANESTHESIA PREPROCEDURE EVALUATION
Anesthesia PreOp Note    HPI:     Wilfredo Rodrigez is a 77year old female who presents for preoperative consultation requested by: Craige Duverney, MD    Date of Surgery: 10/20/2021    Procedure(s):  TOTAL PAROTIDECTOMY  Indication: Benign tumor of parotid DAILY WITH MEALS, Disp: 180 tablet, Rfl: 0, 10/19/2021 at 1900  alendronate 70 MG Oral Tab, Take 1 tablet (70 mg total) by mouth once a week., Disp: 12 tablet, Rfl: 3  furosemide 20 MG Oral Tab, Take 1 tablet (20 mg total) by mouth daily. , Disp: 90 tablet, Smoker      Smokeless tobacco: Never Used    Vaping Use      Vaping Use: Never used    Substance and Sexual Activity      Alcohol use: No      Drug use: No      Sexual activity: Not on file    Other Topics      Concerns:        Not on file    Social Histor CO2 26.0 09/11/2021    BUN 13 09/11/2021    CREATSERUM 0.81 09/11/2021     (H) 09/11/2021    CA 9.5 09/11/2021          Vital Signs: Body mass index is 24.18 kg/m². height is 1.499 m (4' 11\") and weight is 54.3 kg (119 lb 11.2 oz).  Her oral temp

## 2021-10-20 NOTE — ANESTHESIA PROCEDURE NOTES
Airway  Date/Time: 10/20/2021 8:48 AM  Urgency: Elective    Airway not difficult    General Information and Staff    Patient location during procedure: OR  Anesthesiologist: Jack Fisher MD  Performed: anesthesiologist     Indications and Patient Con

## 2021-10-21 ENCOUNTER — TELEPHONE (OUTPATIENT)
Dept: OTOLARYNGOLOGY | Facility: CLINIC | Age: 76
End: 2021-10-21

## 2021-10-21 NOTE — OPERATIVE REPORT
[facility]    PATIENT'S NAME: Ander Wilson   ATTENDING PHYSICIAN: Joseline Santos. Aly June MD   OPERATING PHYSICIAN: Joseline Santos.  Aly June MD   PATIENT ACCOUNT#:   [id number]    LOCATION:  [facility] 10  MEDICAL RECORD #:   [id number]       DATE OF B the case. The pes anserinus was identified and traced forward with identification of the bifurcation with the superior branches intact.   The inferior branches were followed inferiorly and anteriorly with preservation, and the tumor mass was carefully roll

## 2021-10-21 NOTE — TELEPHONE ENCOUNTER
Post op day 1 total parathyroidectomy patient stated she is doing fine little dizzy  BP was 122/48 this morning ,no bleeding,no fever ,dressing dry and intact,reviewed post op medications,CONNOR drain intact draining well with 30cc at 8 pm last night and 20} t

## 2021-10-22 ENCOUNTER — NURSE ONLY (OUTPATIENT)
Dept: OTOLARYNGOLOGY | Facility: CLINIC | Age: 76
End: 2021-10-22
Payer: MEDICARE

## 2021-10-22 VITALS
SYSTOLIC BLOOD PRESSURE: 174 MMHG | WEIGHT: 120 LBS | TEMPERATURE: 97 F | BODY MASS INDEX: 24.19 KG/M2 | HEIGHT: 59 IN | DIASTOLIC BLOOD PRESSURE: 69 MMHG | HEART RATE: 53 BPM

## 2021-10-22 DIAGNOSIS — Z48.03 CHANGE OR REMOVAL OF DRAINS: ICD-10-CM

## 2021-10-22 NOTE — PROGRESS NOTES
Pt here for CONNOR drain removal of J/P drain for total parotidectomy   Pt identified w/2 identifiers and orders verified for CONNOR drain removal.  Pt assisted to exam chair and positioned for possible CONNOR drain removal.  Pt presents w/and CONNOR bulb to suction.   Pt

## 2021-10-22 NOTE — TELEPHONE ENCOUNTER
Per pt son, drain output last night at 9:40 am was 10 ml and this morning at 9 am was 8 ml, light pink in color. Per Dr.Ortega lovell to have drain removed today.            Future Appointments   Date Time Provider Keenan Marie   10/22/2021 10:00 AM [phone]

## 2021-10-28 ENCOUNTER — OFFICE VISIT (OUTPATIENT)
Dept: OTOLARYNGOLOGY | Facility: CLINIC | Age: 76
End: 2021-10-28
Payer: MEDICARE

## 2021-10-28 DIAGNOSIS — K11.8 PAROTID MASS: Primary | ICD-10-CM

## 2021-10-28 PROCEDURE — 99024 POSTOP FOLLOW-UP VISIT: CPT | Performed by: OTOLARYNGOLOGY

## 2021-10-28 NOTE — PROGRESS NOTES
Ileana Haddad is a 77year old female.   Patient presents with:  Post-Op: Parotidectomy surgery f/up      HISTORY OF PRESENT ILLNESS  She presents with several year history of small mass of the left face just by the earlobe which she states has grown sign History:   Diagnosis Date   • Anxiety state    • CAD (coronary artery disease)    • Coronary atherosclerosis    • Esophageal reflux    • Essential hypertension    • Heart attack ([facility] [address] 07/2018   • High blood pressure    • High cholesterol    • History of bl Lips/teeth/gums - Normal. Tonsils - Normal. Oropharynx - Normal.   Ears Normal Inspection - Right: Normal, Left: Normal. Canal - Right: Normal, Left: Normal. TM - Right: Normal, Left: Normal.   Skin Normal Inspection - Normal.        Lymph Detail Normal Holland mass  Healing very nicely incision clean dry and intact Warthin's tumor on path discussed with patient and son return to see me as needed normal facial function at this time        This note was prepared using [employer] voice recognition dictation sof

## 2021-11-08 RX ORDER — LISINOPRIL 40 MG/1
40 TABLET ORAL DAILY
Qty: 90 TABLET | Refills: 1 | Status: SHIPPED | OUTPATIENT
Start: 2021-11-08 | End: 2022-05-09

## 2021-11-08 NOTE — TELEPHONE ENCOUNTER
Refill passed per [facility] protocol.      Requested Prescriptions   Pending Prescriptions Disp Refills    LISINOPRIL 40 MG Oral Tab [Pharmacy Med Name: LISINOPRIL 40MG     TAB] 90 tablet 0     Sig: Take 1 tablet by mouth once daily        Hypertensive Medications Protocol Passed - 11/8/2021  9:38 AM        Passed - CMP or BMP in past 12 months        Passed - Appointment in past 6 or next 3 months        Passed - GFR Non- > 50     Lab Results   Component Value Date    GFRNAA 71 09/11/2021                       [unfilled]      [unfilled]

## 2021-11-15 RX ORDER — FUROSEMIDE 20 MG/1
TABLET ORAL
Qty: 90 TABLET | Refills: 0 | Status: SHIPPED | OUTPATIENT
Start: 2021-11-15 | End: 2022-02-07

## 2021-11-29 RX ORDER — CARVEDILOL 6.25 MG/1
6.25 TABLET ORAL 2 TIMES DAILY WITH MEALS
Qty: 180 TABLET | Refills: 1 | Status: SHIPPED | OUTPATIENT
Start: 2021-11-29

## 2021-11-29 NOTE — TELEPHONE ENCOUNTER
Refill passed per [facility] protocol   Requested Prescriptions   Pending Prescriptions Disp Refills    CARVEDILOL 6.25 MG Oral Tab [Pharmacy Med Name: Carvedilol 6.25 MG Oral Tablet] 180 tablet 0     Sig: TAKE 1 TABLET BY MOUTH TWICE DAILY WITH MEALS

## 2022-01-17 RX ORDER — NIFEDIPINE 30 MG/1
30 TABLET, FILM COATED, EXTENDED RELEASE ORAL DAILY
Qty: 90 TABLET | Refills: 1 | Status: SHIPPED | OUTPATIENT
Start: 2022-01-17 | End: 2022-05-18

## 2022-01-23 RX ORDER — NIFEDIPINE 30 MG/1
30 TABLET, FILM COATED, EXTENDED RELEASE ORAL DAILY
Qty: 90 TABLET | Refills: 1 | OUTPATIENT
Start: 2022-01-23

## 2022-01-24 RX ORDER — NIFEDIPINE 30 MG/1
TABLET, FILM COATED, EXTENDED RELEASE ORAL
Qty: 30 TABLET | Refills: 0 | OUTPATIENT
Start: 2022-01-24

## 2022-02-07 RX ORDER — FUROSEMIDE 20 MG/1
20 TABLET ORAL DAILY
Qty: 90 TABLET | Refills: 1 | Status: SHIPPED | OUTPATIENT
Start: 2022-02-07 | End: 2022-08-15

## 2022-02-07 NOTE — TELEPHONE ENCOUNTER
Refill passed per [facility] protocol.   Requested Prescriptions   Pending Prescriptions Disp Refills    FUROSEMIDE 20 MG Oral Tab [Pharmacy Med Name: Furosemide 20 MG Oral Tablet] 90 tablet 0     Sig: Take 1 tablet by mouth once daily        Hypertensive Medications Protocol Passed - 2/7/2022 10:04 AM        Passed - CMP or BMP in past 12 months        Passed - Appointment in past 6 or next 3 months        Passed - GFR Non- > 50     Lab Results   Component Value Date    GFRNAA 71 09/11/2021                        Recent Outpatient Visits              3 months ago Parotid mass    [facility], [address], Magdalena Sadler MD    Office Visit    3 months ago Change or removal of drains    [facility], [address], [place]    Nurse Only    3 months ago Essential hypertension    [place], [facility], [address], Gracy Gustafson MD    Office Visit    4 months ago Preoperative clearance    [address] Curtis Raza MD    Office Visit    4 months ago Preoperative clearance    [address] Gracy Raza MD    Office Visit

## 2022-05-02 RX ORDER — PANTOPRAZOLE SODIUM 20 MG/1
20 TABLET, DELAYED RELEASE ORAL
Qty: 90 TABLET | Refills: 1 | Status: SHIPPED | OUTPATIENT
Start: 2022-05-02

## 2022-05-02 NOTE — TELEPHONE ENCOUNTER
Refill passed per [employer], [facility] protocol.    Requested Prescriptions   Pending Prescriptions Disp Refills    PANTOPRAZOLE 20 MG Oral Tab EC [Pharmacy Med Name: Pantoprazole Sodium 20 MG Oral Tablet Delayed Release] 90 tablet 0     Sig: TAKE 1 TABLET BY MOUTH ONCE DAILY IN THE MORNING BEFORE BREAKFAST        Gastrointestional Medication Protocol Passed - 5/2/2022 12:21 PM        Passed - Appointment in past 12 or next 3 months               Recent Outpatient Visits              6 months ago Parotid mass    [facility], [address], Alla Sadler MD    Office Visit    6 months ago Change or removal of drains    [facility], [address], [place]    Nurse Only    6 months ago Essential hypertension    [place] [employer], [facility], Karan Clinton MD    Office Visit    7 months ago Preoperative clearance    [place] [employer], [facility], [address], Curtis Waters MD    Office Visit    7 months ago Preoperative clearance    [place] [employer] [facility], Karan Clinton MD    Office Visit

## 2022-05-09 RX ORDER — LISINOPRIL 40 MG/1
40 TABLET ORAL DAILY
Qty: 90 TABLET | Refills: 0 | Status: SHIPPED | OUTPATIENT
Start: 2022-05-09 | End: 2022-05-18

## 2022-05-10 NOTE — TELEPHONE ENCOUNTER
Please review. Protocol Failed or has no Protocol. Three months refill on lisinopril 40 mg granted per [facility] protocol. Needs appt with MD before next refill is due.    Please reply to pool: EM RN TRIAGE      Requested Prescriptions   Pending Prescriptions Disp Refills    LISINOPRIL 40 MG Oral Tab [Pharmacy Med Name: Lisinopril 40 MG Oral Tablet] 90 tablet 0     Sig: Take 1 tablet by mouth once daily        Hypertensive Medications Protocol Failed - 5/9/2022 12:04 PM        Failed - Appointment in past 6 or next 3 months        Passed - CMP or BMP in past 12 months        Passed - GFR Non- > 50     Lab Results   Component Value Date    GFRNAA 71 09/11/2021                      Recent Outpatient Visits              6 months ago Parotid mass    [facility], [address], [place] Sadler MD    Office Visit    6 months ago Change or removal of drains    [facility], [address], [place]    Nurse Only    6 months ago Essential hypertension    [facility], [address]P[place] MD    Office Visit    7 months ago Preoperative clearance    [address] Curtis Raza MD    Office Visit    8 months ago Preoperative clearance    [address] Pavel Raza MD    Office Visit

## 2022-05-18 ENCOUNTER — OFFICE VISIT (OUTPATIENT)
Dept: FAMILY MEDICINE CLINIC | Facility: CLINIC | Age: 77
End: 2022-05-18
Payer: MEDICARE

## 2022-05-18 VITALS
DIASTOLIC BLOOD PRESSURE: 72 MMHG | HEART RATE: 53 BPM | WEIGHT: 123 LBS | SYSTOLIC BLOOD PRESSURE: 186 MMHG | HEIGHT: 59 IN | BODY MASS INDEX: 24.8 KG/M2

## 2022-05-18 DIAGNOSIS — I10 ESSENTIAL HYPERTENSION: Primary | ICD-10-CM

## 2022-05-18 PROCEDURE — 99213 OFFICE O/P EST LOW 20 MIN: CPT | Performed by: FAMILY MEDICINE

## 2022-05-18 RX ORDER — NIFEDIPINE 60 MG/1
60 TABLET, FILM COATED, EXTENDED RELEASE ORAL DAILY
Qty: 90 TABLET | Refills: 1 | Status: SHIPPED | OUTPATIENT
Start: 2022-05-18

## 2022-05-18 RX ORDER — OLMESARTAN MEDOXOMIL 20 MG/1
20 TABLET ORAL DAILY
Qty: 30 TABLET | Refills: 3 | Status: SHIPPED | OUTPATIENT
Start: 2022-05-18

## 2022-05-21 NOTE — TELEPHONE ENCOUNTER
Refill passed per [facility], [facility] protocol.      Requested Prescriptions   Pending Prescriptions Disp Refills    NIFEDIPINE 30 MG Oral Tablet 24 Hr [Pharmacy Med Name: NIFEdipine ER 30 MG Oral Tablet Extended Release 24 Hour] 30 tablet 0     Sig: Take 1 tablet by mouth once daily        Hypertensive Medications Protocol Passed - 1/17/2022  9:36 AM        Passed - CMP or BMP in past 12 months        Passed - Appointment in past 6 or next 3 months        Passed - GFR Non- > 50     Lab Results   Component Value Date    GFRNAA 71 09/11/2021                             Recent Outpatient Visits              2 months ago Parotid mass    [facility], [address], Reagan Sadler MD    Office Visit    2 months ago Change or removal of drains    [facility], [address], [place]    Nurse Only    3 months ago Essential hypertension    [place] [employer] [facility] [place], [facility], [address], Andrea Murguia MD    Office Visit    3 months ago Preoperative clearance    [address] Curtis Raza MD    Office Visit    4 months ago Preoperative clearance    [address] Andrea Raza MD    Office Visit
1

## 2022-05-31 RX ORDER — CARVEDILOL 6.25 MG/1
6.25 TABLET ORAL 2 TIMES DAILY WITH MEALS
Qty: 180 TABLET | Refills: 1 | Status: SHIPPED | OUTPATIENT
Start: 2022-05-31

## 2022-05-31 NOTE — TELEPHONE ENCOUNTER
Refill passed per [facility] protocol.   Requested Prescriptions   Pending Prescriptions Disp Refills    CARVEDILOL 6.25 MG Oral Tab [Pharmacy Med Name: Carvedilol 6.25 MG Oral Tablet] 180 tablet 0     Sig: TAKE 1 TABLET BY MOUTH TWICE DAILY WITH MEALS        Hypertensive Medications Protocol Passed - 5/31/2022 12:12 PM        Passed - CMP or BMP in past 12 months        Passed - Appointment in past 6 or next 3 months        Passed - GFR Non- > 50     Lab Results   Component Value Date    GFRNAA 71 09/11/2021                     Recent Outpatient Visits              1 week ago Essential hypertension    [facility], [address], Curtis Madrigal MD    Office Visit    7 months ago Parotid mass    [facility], [address], Chalino Sadler MD    Office Visit    7 months ago Change or removal of drains    [facility], [address], [place]    Nurse Only    7 months ago Essential hypertension    [facility], [address], Celi Erickson MD    Office Visit    8 months ago Preoperative clearance    [address] Celi Raza MD    Office Visit          Future Appointments         Provider Department Appt Notes    In 2 weeks Paola Madrigal MD [facility], [address], [address] Medicare

## 2022-06-15 ENCOUNTER — OFFICE VISIT (OUTPATIENT)
Dept: FAMILY MEDICINE CLINIC | Facility: CLINIC | Age: 77
End: 2022-06-15
Payer: MEDICARE

## 2022-06-15 VITALS
SYSTOLIC BLOOD PRESSURE: 139 MMHG | HEART RATE: 69 BPM | HEIGHT: 59 IN | BODY MASS INDEX: 25.2 KG/M2 | DIASTOLIC BLOOD PRESSURE: 86 MMHG | WEIGHT: 125 LBS

## 2022-06-15 DIAGNOSIS — Z12.31 ENCOUNTER FOR SCREENING MAMMOGRAM FOR MALIGNANT NEOPLASM OF BREAST: ICD-10-CM

## 2022-06-15 DIAGNOSIS — K29.70 HELICOBACTER PYLORI GASTRITIS: ICD-10-CM

## 2022-06-15 DIAGNOSIS — Z91.81 RISK FOR FALLS: ICD-10-CM

## 2022-06-15 DIAGNOSIS — M81.0 AGE-RELATED OSTEOPOROSIS WITHOUT CURRENT PATHOLOGICAL FRACTURE: ICD-10-CM

## 2022-06-15 DIAGNOSIS — R73.9 HYPERGLYCEMIA: ICD-10-CM

## 2022-06-15 DIAGNOSIS — K27.9 PUD (PEPTIC ULCER DISEASE): ICD-10-CM

## 2022-06-15 DIAGNOSIS — I10 ESSENTIAL HYPERTENSION: ICD-10-CM

## 2022-06-15 DIAGNOSIS — Z12.11 COLON CANCER SCREENING: ICD-10-CM

## 2022-06-15 DIAGNOSIS — D50.0 IRON DEFICIENCY ANEMIA DUE TO CHRONIC BLOOD LOSS: ICD-10-CM

## 2022-06-15 DIAGNOSIS — B96.81 HELICOBACTER PYLORI GASTRITIS: ICD-10-CM

## 2022-06-15 DIAGNOSIS — K64.8 INTERNAL HEMORRHOIDS WITHOUT COMPLICATION: ICD-10-CM

## 2022-06-15 DIAGNOSIS — Z00.00 MEDICARE ANNUAL WELLNESS VISIT, SUBSEQUENT: Primary | ICD-10-CM

## 2022-06-15 DIAGNOSIS — E55.9 VITAMIN D DEFICIENCY: ICD-10-CM

## 2022-06-15 DIAGNOSIS — D49.0 PAROTID TUMOR: ICD-10-CM

## 2022-06-20 ENCOUNTER — LAB ENCOUNTER (OUTPATIENT)
Dept: LAB | Age: 77
End: 2022-06-20
Attending: FAMILY MEDICINE
Payer: MEDICARE

## 2022-06-20 DIAGNOSIS — D50.0 IRON DEFICIENCY ANEMIA DUE TO CHRONIC BLOOD LOSS: ICD-10-CM

## 2022-06-20 DIAGNOSIS — R73.9 HYPERGLYCEMIA: ICD-10-CM

## 2022-06-20 DIAGNOSIS — E55.9 VITAMIN D DEFICIENCY: ICD-10-CM

## 2022-06-20 DIAGNOSIS — I10 ESSENTIAL HYPERTENSION: ICD-10-CM

## 2022-06-20 LAB
ALBUMIN SERPL-MCNC: 3.9 G/DL (ref 3.4–5)
ALBUMIN/GLOB SERPL: 1.1 {RATIO} (ref 1–2)
ALP LIVER SERPL-CCNC: 85 U/L
ALT SERPL-CCNC: 18 U/L
ANION GAP SERPL CALC-SCNC: 8 MMOL/L (ref 0–18)
AST SERPL-CCNC: 19 U/L (ref 15–37)
BASOPHILS # BLD AUTO: 0.03 X10(3) UL (ref 0–0.2)
BASOPHILS NFR BLD AUTO: 0.7 %
BILIRUB SERPL-MCNC: 0.5 MG/DL (ref 0.1–2)
BILIRUB UR QL: NEGATIVE
BUN BLD-MCNC: 24 MG/DL (ref 7–18)
BUN/CREAT SERPL: 26.1 (ref 10–20)
CALCIUM BLD-MCNC: 9 MG/DL (ref 8.5–10.1)
CHLORIDE SERPL-SCNC: 110 MMOL/L (ref 98–112)
CHOLEST SERPL-MCNC: 117 MG/DL (ref ?–200)
CLARITY UR: CLEAR
CO2 SERPL-SCNC: 26 MMOL/L (ref 21–32)
COLOR UR: YELLOW
CREAT BLD-MCNC: 0.92 MG/DL
DEPRECATED RDW RBC AUTO: 42.8 FL (ref 35.1–46.3)
EOSINOPHIL # BLD AUTO: 0.13 X10(3) UL (ref 0–0.7)
EOSINOPHIL NFR BLD AUTO: 3.1 %
ERYTHROCYTE [DISTWIDTH] IN BLOOD BY AUTOMATED COUNT: 11.9 % (ref 11–15)
EST. AVERAGE GLUCOSE BLD GHB EST-MCNC: 123 MG/DL (ref 68–126)
FASTING PATIENT LIPID ANSWER: YES
FASTING STATUS PATIENT QL REPORTED: YES
GLOBULIN PLAS-MCNC: 3.7 G/DL (ref 2.8–4.4)
GLUCOSE BLD-MCNC: 108 MG/DL (ref 70–99)
GLUCOSE UR-MCNC: NEGATIVE MG/DL
HBA1C MFR BLD: 5.9 % (ref ?–5.7)
HCT VFR BLD AUTO: 37 %
HDLC SERPL-MCNC: 66 MG/DL (ref 40–59)
HGB BLD-MCNC: 12 G/DL
IMM GRANULOCYTES # BLD AUTO: 0.01 X10(3) UL (ref 0–1)
IMM GRANULOCYTES NFR BLD: 0.2 %
KETONES UR-MCNC: NEGATIVE MG/DL
LDLC SERPL CALC-MCNC: 38 MG/DL (ref ?–100)
LEUKOCYTE ESTERASE UR QL STRIP.AUTO: NEGATIVE
LYMPHOCYTES # BLD AUTO: 1.55 X10(3) UL (ref 1–4)
LYMPHOCYTES NFR BLD AUTO: 37.2 %
MCH RBC QN AUTO: 31.7 PG (ref 26–34)
MCHC RBC AUTO-ENTMCNC: 32.4 G/DL (ref 31–37)
MCV RBC AUTO: 97.6 FL
MONOCYTES # BLD AUTO: 0.34 X10(3) UL (ref 0.1–1)
MONOCYTES NFR BLD AUTO: 8.2 %
NEUTROPHILS # BLD AUTO: 2.11 X10 (3) UL (ref 1.5–7.7)
NEUTROPHILS # BLD AUTO: 2.11 X10(3) UL (ref 1.5–7.7)
NEUTROPHILS NFR BLD AUTO: 50.6 %
NITRITE UR QL STRIP.AUTO: NEGATIVE
NONHDLC SERPL-MCNC: 51 MG/DL (ref ?–130)
OSMOLALITY SERPL CALC.SUM OF ELEC: 303 MOSM/KG (ref 275–295)
PH UR: 7 [PH] (ref 5–8)
PLATELET # BLD AUTO: 236 10(3)UL (ref 150–450)
POTASSIUM SERPL-SCNC: 4.3 MMOL/L (ref 3.5–5.1)
PROT SERPL-MCNC: 7.6 G/DL (ref 6.4–8.2)
PROT UR-MCNC: NEGATIVE MG/DL
RBC # BLD AUTO: 3.79 X10(6)UL
SODIUM SERPL-SCNC: 144 MMOL/L (ref 136–145)
SP GR UR STRIP: 1.02 (ref 1–1.03)
TRIGL SERPL-MCNC: 61 MG/DL (ref 30–149)
TSI SER-ACNC: 0.58 MIU/ML (ref 0.36–3.74)
UROBILINOGEN UR STRIP-ACNC: 0.2
VIT D+METAB SERPL-MCNC: 41.9 NG/ML (ref 30–100)
VLDLC SERPL CALC-MCNC: 8 MG/DL (ref 0–30)
WBC # BLD AUTO: 4.2 X10(3) UL (ref 4–11)

## 2022-06-20 PROCEDURE — 80053 COMPREHEN METABOLIC PANEL: CPT

## 2022-06-20 PROCEDURE — 36415 COLL VENOUS BLD VENIPUNCTURE: CPT

## 2022-06-20 PROCEDURE — 83036 HEMOGLOBIN GLYCOSYLATED A1C: CPT

## 2022-06-20 PROCEDURE — 80061 LIPID PANEL: CPT

## 2022-06-20 PROCEDURE — 82306 VITAMIN D 25 HYDROXY: CPT

## 2022-06-20 PROCEDURE — 85025 COMPLETE CBC W/AUTO DIFF WBC: CPT

## 2022-06-20 PROCEDURE — 84443 ASSAY THYROID STIM HORMONE: CPT

## 2022-06-20 PROCEDURE — 81001 URINALYSIS AUTO W/SCOPE: CPT

## 2022-08-15 RX ORDER — FUROSEMIDE 20 MG/1
TABLET ORAL
Qty: 90 TABLET | Refills: 0 | Status: SHIPPED | OUTPATIENT
Start: 2022-08-15

## 2022-09-06 RX ORDER — OLMESARTAN MEDOXOMIL 20 MG/1
20 TABLET ORAL DAILY
Qty: 90 TABLET | Refills: 1 | Status: SHIPPED | OUTPATIENT
Start: 2022-09-06

## 2022-09-06 NOTE — TELEPHONE ENCOUNTER
Refill passed per [address] [place] [facility] protocol.    Requested Prescriptions   Pending Prescriptions Disp Refills    OLMESARTAN 20 MG Oral Tab [Pharmacy Med Name: Olmesartan Medoxomil 20 MG Oral Tablet] 30 tablet 0     Sig: Take 1 tablet by mouth once daily        Hypertensive Medications Protocol Passed - 9/5/2022  2:02 PM        Passed - In person appointment in the past 12 or next 3 months       Recent Outpatient Visits              2 months ago Medicare annual wellness visit, subsequent    [address], Curtis Limon MD    Office Visit    3 months ago Essential hypertension    [address], Curtis Limon MD    Office Visit    10 months ago Parotid mass    [facility], [address], Sandy Sadler MD    Office Visit    10 months ago Change or removal of drains    [facility], [address], [place]    Nurse Only    10 months ago Essential hypertension    [address], Curtis Limon MD    Office Visit                 Passed - Last BP reading less than 140/90     BP Readings from Last 1 Encounters:  06/15/22 : 139/86                Passed - CMP or BMP in past 6 months     Recent Results (from the past 4392 hour(s))   COMP METABOLIC PANEL (14)    Collection Time: 06/20/22  8:08 AM   Result Value Ref Range    Glucose 108 (H) 70 - 99 mg/dL    Sodium 144 136 - 145 mmol/L    Potassium 4.3 3.5 - 5.1 mmol/L    Chloride 110 98 - 112 mmol/L    CO2 26.0 21.0 - 32.0 mmol/L    Anion Gap 8 0 - 18 mmol/L    BUN 24 (H) 7 - 18 mg/dL    Creatinine 0.92 0.55 - 1.02 mg/dL    BUN/CREA Ratio 26.1 (H) 10.0 - 20.0    Calcium, Total 9.0 8.5 - 10.1 mg/dL    Calculated Osmolality 303 (H) 275 - 295 mOsm/kg    GFR, Non- 61 >=60    GFR, -American 70 >=60    ALT 18 13 - 56 U/L    AST 19 15 - 37 U/L    Alkaline Phosphatase 85 55 - 142 U/L    Bilirubin, Total 0.5 0.1 - 2.0 mg/dL    Total Protein 7.6 6.4 - 8.2 g/dL    Albumin 3.9 3.4 - 5.0 g/dL    Globulin  3.7 2.8 - 4.4 g/dL    A/G Ratio 1.1 1.0 - 2.0    Patient Fasting for CMP? Yes      *Note: Due to a large number of results and/or encounters for the requested time period, some results have not been displayed. A complete set of results can be found in Results Review.                  Passed - In person appointment or virtual visit in the past 6 months       Recent Outpatient Visits              2 months ago Medicare annual wellness visit, subsequent    [facility], Curtis Bay MD    Office Visit    3 months ago Essential hypertension    [address], Curtis Bay MD    Office Visit    10 months ago Parotid mass [facility], [address], Tracey Sadler MD    Office Visit    10 months ago Change or removal of drains    [facility], [address], [place]    Nurse Only    10 months ago Essential hypertension    [address], Allison Ireland MD    Office Visit                 Passed - GFR > 50     No results found for: [facility]                   Recent Outpatient Visits              2 months ago Medicare annual wellness visit, subsequent    [address] Leana Lange, MD    Office Visit    3 months ago Essential hypertension    [address], Curtis Bay MD    Office Visit    10 months ago Parotid mass [facility], [address], Tracey Sadler MD    Office Visit    10 months ago Change or removal of drains    [facility], [address], [place]    Nurse Only    10 months ago Essential hypertension    Allison Capone MD    Office Visit

## 2022-11-15 RX ORDER — FUROSEMIDE 20 MG/1
20 TABLET ORAL DAILY
Qty: 90 TABLET | Refills: 0 | Status: SHIPPED | OUTPATIENT
Start: 2022-11-15

## 2022-11-15 NOTE — TELEPHONE ENCOUNTER
Refill passed per [facility] protocol.      Requested Prescriptions   Pending Prescriptions Disp Refills    FUROSEMIDE 20 MG Oral Tab [Pharmacy Med Name: Furosemide 20 MG Oral Tablet] 90 tablet 0     Sig: Take 1 tablet by mouth once daily       Hypertensive Medications Protocol Passed - 11/14/2022  2:02 PM        Passed - In person appointment in the past 12 or next 3 months     Recent Outpatient Visits              5 months ago Medicare annual wellness visit, subsequent    [address] Curtis Raza MD    Office Visit    6 months ago Essential hypertension    [employer] [facility], [address], Curtis Reynolds MD    Office Visit    1 year ago Parotid mass    [facility], [address], Isaiah Sadler MD    Office Visit    1 year ago Change or removal of drains    [facility], [address], [place]    Nurse Only    1 year ago Essential hypertension    [place] [employer], [address], Curtis Reynolds MD    Office Visit                      Passed - Last BP reading less than 140/90     BP Readings from Last 1 Encounters:  06/15/22 : 139/86              Passed - CMP or BMP in past 6 months     Recent Results (from the past 4392 hour(s))   COMP METABOLIC PANEL (14)    Collection Time: 06/20/22  8:08 AM   Result Value Ref Range    Glucose 108 (H) 70 - 99 mg/dL    Sodium 144 136 - 145 mmol/L    Potassium 4.3 3.5 - 5.1 mmol/L    Chloride 110 98 - 112 mmol/L    CO2 26.0 21.0 - 32.0 mmol/L    Anion Gap 8 0 - 18 mmol/L    BUN 24 (H) 7 - 18 mg/dL    Creatinine 0.92 0.55 - 1.02 mg/dL    BUN/CREA Ratio 26.1 (H) 10.0 - 20.0    Calcium, Total 9.0 8.5 - 10.1 mg/dL    Calculated Osmolality 303 (H) 275 - 295 mOsm/kg    GFR, Non- 61 >=60    GFR, -American 70 >=60    ALT 18 13 - 56 U/L    AST 19 15 - 37 U/L    Alkaline Phosphatase 85 55 - 142 U/L    Bilirubin, Total 0.5 0.1 - 2.0 mg/dL    Total Protein 7.6 6.4 - 8.2 g/dL    Albumin 3.9 3.4 - 5.0 g/dL    Globulin  3.7 2.8 - 4.4 g/dL    A/G Ratio 1.1 1.0 - 2.0    Patient Fasting for CMP? Yes      *Note: Due to a large number of results and/or encounters for the requested time period, some results have not been displayed. A complete set of results can be found in Results Review.                Passed - In person appointment or virtual visit in the past 6 months     Recent Outpatient Visits              5 months ago Medicare annual wellness visit, subsequent    [address] Curtis Raza MD    Office Visit    6 months ago Essential hypertension    [address], Curtis Goodwin MD    Office Visit    1 year ago Parotid mass [facility], [address], Henry Sadler MD    Office Visit    1 year ago Change or removal of drains    [facility], [address], [place]    Nurse Only    1 year ago Essential hypertension    [address], Damaris Thurman MD    Office Visit                      Passed - [facility] or GFRNAA > 50     GFR Evaluation  GFRNAA: 61 , resulted on 6/20/2022                Recent Outpatient Visits              5 months ago Medicare annual wellness visit, subsequent    [address] Curtis Raza MD    Office Visit    6 months ago Essential hypertension    [address], Curtis Goodwin MD    Office Visit    1 year ago Parotid mass [facility], [address], Henry Sadler MD    Office Visit    1 year ago Change or removal of drains    [facility], [address], [place]    Nurse Only    1 year ago Essential hypertension    [address], Damaris Thurman MD    Office Visit

## 2022-12-01 RX ORDER — CARVEDILOL 6.25 MG/1
6.25 TABLET ORAL 2 TIMES DAILY WITH MEALS
Qty: 180 TABLET | Refills: 0 | Status: SHIPPED | OUTPATIENT
Start: 2022-12-01

## 2022-12-01 NOTE — TELEPHONE ENCOUNTER
Refill passed per [address] [facility] [place] [facility] protocol.    Requested Prescriptions   Pending Prescriptions Disp Refills    CARVEDILOL 6.25 MG Oral Tab [Pharmacy Med Name: Carvedilol 6.25 MG Oral Tablet] 180 tablet 0     Sig: TAKE 1 TABLET BY MOUTH TWICE DAILY WITH MEALS       Hypertensive Medications Protocol Passed - 11/28/2022 10:12 AM        Passed - In person appointment in the past 12 or next 3 months     Recent Outpatient Visits              5 months ago Medicare annual wellness visit, subsequent    [address] Curtis Raza MD    Office Visit    6 months ago Essential hypertension    [address], [address], Curtis Maria MD    Office Visit    1 year ago Parotid mass    [facility], [place], Demetria Sadler MD    Office Visit    1 year ago Change or removal of drains    [facility], [place], [place]    Nurse Only    1 year ago Essential hypertension    [facility], Curtis Clinton MD    Office Visit                      Passed - Last BP reading less than 140/90     BP Readings from Last 1 Encounters:  06/15/22 : 139/86              Passed - CMP or BMP in past 6 months     Recent Results (from the past 4392 hour(s))   COMP METABOLIC PANEL (14)    Collection Time: 06/20/22  8:08 AM   Result Value Ref Range    Glucose 108 (H) 70 - 99 mg/dL    Sodium 144 136 - 145 mmol/L    Potassium 4.3 3.5 - 5.1 mmol/L    Chloride 110 98 - 112 mmol/L    CO2 26.0 21.0 - 32.0 mmol/L    Anion Gap 8 0 - 18 mmol/L    BUN 24 (H) 7 - 18 mg/dL    Creatinine 0.92 0.55 - 1.02 mg/dL    BUN/CREA Ratio 26.1 (H) 10.0 - 20.0    Calcium, Total 9.0 8.5 - 10.1 mg/dL    Calculated Osmolality 303 (H) 275 - 295 mOsm/kg    GFR, Non- 61 >=60    GFR, -American 70 >=60    ALT 18 13 - 56 U/L    AST 19 15 - 37 U/L    Alkaline Phosphatase 85 55 - 142 U/L    Bilirubin, Total 0.5 0.1 - 2.0 mg/dL    Total Protein 7.6 6.4 - 8.2 g/dL    Albumin 3.9 3.4 - 5.0 g/dL    Globulin  3.7 2.8 - 4.4 g/dL    A/G Ratio 1.1 1.0 - 2.0    Patient Fasting for CMP? Yes      *Note: Due to a large number of results and/or encounters for the requested time period, some results have not been displayed. A complete set of results can be found in Results Review.                Passed - In person appointment or virtual visit in the past 6 months     Recent Outpatient Visits              5 months ago Medicare annual wellness visit, subsequent    [address] Curtis Raza MD    Office Visit    6 months ago Essential hypertension    [address], Curtis Edmond MD    Office Visit    1 year ago Parotid mass [facility], [place], Kathrin Sadler MD    Office Visit    1 year ago Change or removal of drains    [facility], [place], [place]    Nurse Only    1 year ago Essential hypertension    [address], Amy Colunga MD    Office Visit                      Passed - [facility] or GFRNAA > 50     GFR Evaluation  GFRNAA: 61 , resulted on 6/20/2022               Recent Outpatient Visits              5 months ago Medicare annual wellness visit, subsequent    [address] Curtis Raza MD    Office Visit    6 months ago Essential hypertension    [address], Curtis Edmond MD    Office Visit    1 year ago Parotid mass [facility], [place], Kathrin Sadler MD    Office Visit    1 year ago Change or removal of drains    [facility], [place], [place]    Nurse Only    1 year ago Essential hypertension    [address], Amy Colunga MD    Office Visit

## 2022-12-01 NOTE — TELEPHONE ENCOUNTER
Refill passed per [address] [facility] [place] [facility] protocol.    Requested Prescriptions   Pending Prescriptions Disp Refills    CARVEDILOL 6.25 MG Oral Tab [Pharmacy Med Name: Carvedilol 6.25 MG Oral Tablet] 180 tablet 0     Sig: TAKE 1 TABLET BY MOUTH TWICE DAILY WITH MEALS       Hypertensive Medications Protocol Passed - 11/28/2022 10:12 AM        Passed - In person appointment in the past 12 or next 3 months     Recent Outpatient Visits              5 months ago Medicare annual wellness visit, subsequent    [address], Curtis Baldwin MD    Office Visit    6 months ago Essential hypertension    [address], [address], Curtis Baldwin MD    Office Visit    1 year ago Parotid mass    [facility], [address], Ruel Sadler MD    Office Visit    1 year ago Change or removal of drains    [facility], [address], [place]    Nurse Only    1 year ago Essential hypertension    [facility], [address], Curtis Baldwin MD    Office Visit                      Passed - Last BP reading less than 140/90     BP Readings from Last 1 Encounters:  06/15/22 : 139/86              Passed - CMP or BMP in past 6 months     Recent Results (from the past 4392 hour(s))   COMP METABOLIC PANEL (14)    Collection Time: 06/20/22  8:08 AM   Result Value Ref Range    Glucose 108 (H) 70 - 99 mg/dL    Sodium 144 136 - 145 mmol/L    Potassium 4.3 3.5 - 5.1 mmol/L    Chloride 110 98 - 112 mmol/L    CO2 26.0 21.0 - 32.0 mmol/L    Anion Gap 8 0 - 18 mmol/L    BUN 24 (H) 7 - 18 mg/dL    Creatinine 0.92 0.55 - 1.02 mg/dL    BUN/CREA Ratio 26.1 (H) 10.0 - 20.0    Calcium, Total 9.0 8.5 - 10.1 mg/dL    Calculated Osmolality 303 (H) 275 - 295 mOsm/kg    GFR, Non- 61 >=60    GFR, -American 70 >=60    ALT 18 13 - 56 U/L    AST 19 15 - 37 U/L    Alkaline Phosphatase 85 55 - 142 U/L    Bilirubin, Total 0.5 0.1 - 2.0 mg/dL    Total Protein 7.6 6.4 - 8.2 g/dL    Albumin 3.9 3.4 - 5.0 g/dL    Globulin  3.7 2.8 - 4.4 g/dL    A/G Ratio 1.1 1.0 - 2.0    Patient Fasting for CMP? Yes      *Note: Due to a large number of results and/or encounters for the requested time period, some results have not been displayed. A complete set of results can be found in Results Review.                Passed - In person appointment or virtual visit in the past 6 months     Recent Outpatient Visits              5 months ago Medicare annual wellness visit, subsequent    [address] Curtis Raza MD    Office Visit    6 months ago Essential hypertension    [place] [employer], [address] Tay, Curtis Kidd MD    Office Visit    1 year ago Parotid mass [facility], [address], Elizabeth Sadler MD    Office Visit    1 year ago Change or removal of drains    [facility], [address], [place]    Nurse Only    1 year ago Essential hypertension    [facility], [address], Mansi Couch MD    Office Visit                      Passed - [facility] or GFRNAA > 50     GFR Evaluation  GFRNAA: 61 , resulted on 6/20/2022               Recent Outpatient Visits              5 months ago Medicare annual wellness visit, subsequent    [address] Curtis Raza MD    Office Visit    6 months ago Essential hypertension    [facility], [address], Curtis Kidd MD    Office Visit    1 year ago [facility], [address], Elizabeth Sadler MD    Office Visit    1 year ago Change or removal of drains    [facility], [address], [place]    Nurse Only    1 year ago Essential hypertension    [place] [employer], [address], Mansi Couch MD    Office Visit

## 2022-12-12 RX ORDER — NIFEDIPINE 60 MG/1
TABLET, FILM COATED, EXTENDED RELEASE ORAL
Qty: 90 TABLET | Refills: 0 | Status: SHIPPED | OUTPATIENT
Start: 2022-12-12

## 2022-12-12 RX ORDER — PANTOPRAZOLE SODIUM 20 MG/1
TABLET, DELAYED RELEASE ORAL
Qty: 90 TABLET | Refills: 0 | Status: SHIPPED | OUTPATIENT
Start: 2022-12-12

## 2023-02-28 RX ORDER — FUROSEMIDE 20 MG/1
20 TABLET ORAL DAILY
Qty: 90 TABLET | Refills: 1 | Status: SHIPPED | OUTPATIENT
Start: 2023-02-28

## 2023-02-28 NOTE — TELEPHONE ENCOUNTER
Please review. Protocol failed / No protocol. [employer] message sent advising patient to schedule follow-up appointment. Requested Prescriptions   Pending Prescriptions Disp Refills    FUROSEMIDE 20 MG Oral Tab [Pharmacy Med Name: Furosemide 20 MG Oral Tablet] 90 tablet 1     Sig: Take 1 tablet by mouth once daily       Hypertensive Medications Protocol Failed - 2/27/2023 10:25 AM        Failed - CMP or BMP in past 6 months     No results found for this or any previous visit (from the past 4392 hour(s)).             Failed - In person appointment or virtual visit in the past 6 months     Recent Outpatient Visits              8 months ago Medicare annual wellness visit, subsequent    [address] [facility], [place] Tessie Babinski, MD    Office Visit    9 months ago Essential hypertension    Dwight Willingham, Frances Mcardle, MD    Office Visit    1 year ago Parotid mass    Desirae Feng, [address], Burton Sadler MD    Office Visit    1 year ago Change or removal of drains    [facility], [address], [place]    Nurse Only    1 year ago Essential hypertension    [address] [facility], [place] Tessie Babinski, MD    Office Visit                      Passed - In person appointment in the past 12 or next 3 months     Recent Outpatient Visits              8 months ago Medicare annual wellness visit, subsequent    Dwight Willingham, Frances Mcardle, MD    Office Visit    9 months ago Essential hypertension    [address] [facility], Frances Mcardle, MD    Office Visit    1 year ago Parotid mass    [address] [facility], Burton Sadler MD    Office Visit    1 year ago Change or removal of drains    Desirae Feng, [address], [place]    Nurse Only    1 year ago Essential hypertension Curtis Gilliland MD    Office Visit                      Passed - Last BP reading less than 140/90     BP Readings from Last 1 Encounters:  06/15/22 : 139/86              Passed - EGFRCR or GFRNAA > 50     GFR Evaluation  GFRNAA: 61 , resulted on 6/20/2022               Recent Outpatient Visits              8 months ago Medicare annual wellness visit, subsequent    Aki Gilliland MD    Office Visit    9 months ago Essential hypertension    Aki Gilliland MD    Office Visit    1 year ago Parotid mass    Viktoriya Gilliland Mardell Mose, MD    Office Visit    1 year ago Change or removal of drains    Mamta Kelly, [address], [place]    Nurse Only    1 year ago Essential hypertension    Aki Gilliland MD    Office Visit

## 2023-03-06 RX ORDER — CARVEDILOL 6.25 MG/1
TABLET ORAL
Qty: 180 TABLET | Refills: 0 | Status: SHIPPED | OUTPATIENT
Start: 2023-03-06

## 2023-03-31 RX ORDER — PANTOPRAZOLE SODIUM 20 MG/1
TABLET, DELAYED RELEASE ORAL
Qty: 90 TABLET | Refills: 3 | Status: SHIPPED | OUTPATIENT
Start: 2023-03-31

## 2023-04-01 NOTE — TELEPHONE ENCOUNTER
Refill passed per [facility] protocol   Requested Prescriptions   Pending Prescriptions Disp Refills    PANTOPRAZOLE 20 MG Oral Tab EC [Pharmacy Med Name: Pantoprazole Sodium 20 MG Oral Tablet Delayed Release] 90 tablet 0     Sig: TAKE 1 TABLET BY MOUTH ONCE DAILY IN THE MORNING BEFORE BREAKFAST       Gastrointestional Medication Protocol Passed - 3/31/2023 10:29 AM        Passed - In person appointment or virtual visit in the past 12 mos or appointment in next 3 mos     Recent Outpatient Visits              9 months ago Medicare annual wellness visit, subsequent    [address], Dwight Tay, Burgess Orestes MD    Office Visit    10 months ago Essential hypertension    [address], Dwight Tay, Burgess Orestes MD    Office Visit    1 year ago Parotid mass    Viktoriya Medellin Rheba Madrid, MD    Office Visit    1 year ago Change or removal of drains    [address], [address], [place]    Nurse Only    1 year ago Essential hypertension    Burgess Orestes Medellin MD    Office Visit

## 2023-06-20 RX ORDER — OLMESARTAN MEDOXOMIL 20 MG/1
20 TABLET ORAL DAILY
Qty: 90 TABLET | Refills: 0 | OUTPATIENT
Start: 2023-06-20

## 2023-06-20 RX ORDER — CARVEDILOL 6.25 MG/1
6.25 TABLET ORAL 2 TIMES DAILY WITH MEALS
Qty: 10 TABLET | Refills: 0 | OUTPATIENT
Start: 2023-06-20

## 2023-06-20 RX ORDER — FUROSEMIDE 20 MG/1
20 TABLET ORAL DAILY
Qty: 90 TABLET | Refills: 1 | OUTPATIENT
Start: 2023-06-20

## 2023-06-20 RX ORDER — NIFEDIPINE 60 MG/1
60 TABLET, FILM COATED, EXTENDED RELEASE ORAL DAILY
Qty: 90 TABLET | Refills: 0 | OUTPATIENT
Start: 2023-06-20

## 2023-06-25 NOTE — TELEPHONE ENCOUNTER
Dr. Nadege Schuster, Pt has an appointment 7/6/23. Ok to provide 30 days supply?     Future Appointments   Date Time Provider Keenan Rubioi   7/6/2023  9:00 AM Ck Maria MD [facility]            Disp Refills Start End    CARVEDILOL 6.25 MG Oral Tab [Pharmacy Med Name: Carvedilol 6.25 MG Oral Tablet] 180 tablet 3 6/23/2023     Request refused: Appt required, please call patient    Sig: TAKE 1 TABLET BY MOUTH TWICE DAILY WITH MEALS    Renewals    Renewal provider: Ck Maria MD

## 2023-06-26 RX ORDER — OLMESARTAN MEDOXOMIL 20 MG/1
20 TABLET ORAL DAILY
Qty: 30 TABLET | Refills: 0 | Status: SHIPPED | OUTPATIENT
Start: 2023-06-26

## 2023-06-26 RX ORDER — CARVEDILOL 6.25 MG/1
6.25 TABLET ORAL 2 TIMES DAILY WITH MEALS
Qty: 60 TABLET | Refills: 0 | Status: SHIPPED | OUTPATIENT
Start: 2023-06-26

## 2023-06-26 RX ORDER — NIFEDIPINE 60 MG/1
60 TABLET, FILM COATED, EXTENDED RELEASE ORAL DAILY
Qty: 30 TABLET | Refills: 0 | Status: SHIPPED | OUTPATIENT
Start: 2023-06-26

## 2023-06-26 RX ORDER — CARVEDILOL 6.25 MG/1
6.25 TABLET ORAL 2 TIMES DAILY WITH MEALS
Qty: 30 TABLET | Refills: 0 | OUTPATIENT
Start: 2023-06-26

## 2023-06-26 NOTE — TELEPHONE ENCOUNTER
Daughter/Carmel [phone]. is calling for the status of the medication refill request. Pt does have an appointment scheduled with Dr. Michelle Luis on 7-6-23. Daughter, states that the patient is out of medication and has also received a few from the local pharmacy.

## 2023-06-26 NOTE — TELEPHONE ENCOUNTER
Has been requesting refill for the past few days-see encounter started on 6/19/23. Per daughter, patient has upcoming appointment on 7/6/23. Pharmacy already gave them 5 day supply but that is not enough until the next appointment. Daughter upset that she's been requesting the refill and keep denying it. Requesting at least 30 days supply until patient sees Dr Jourdan Swann on 7/6/23. Future Appointments   Date Time Provider Keenan Marie   7/6/2023  9:00 AM Aram Brambila MD [facility]     Contacted DR Jourdan Swann to check the refill encounter.

## 2023-07-06 ENCOUNTER — OFFICE VISIT (OUTPATIENT)
Dept: FAMILY MEDICINE CLINIC | Facility: CLINIC | Age: 78
End: 2023-07-06

## 2023-07-06 VITALS
WEIGHT: 125.19 LBS | BODY MASS INDEX: 25.24 KG/M2 | SYSTOLIC BLOOD PRESSURE: 139 MMHG | HEIGHT: 59 IN | HEART RATE: 70 BPM | DIASTOLIC BLOOD PRESSURE: 86 MMHG

## 2023-07-06 DIAGNOSIS — M81.0 AGE-RELATED OSTEOPOROSIS WITHOUT CURRENT PATHOLOGICAL FRACTURE: ICD-10-CM

## 2023-07-06 DIAGNOSIS — K64.8 INTERNAL HEMORRHOIDS WITHOUT COMPLICATION: ICD-10-CM

## 2023-07-06 DIAGNOSIS — H53.8 BLURRED VISION: ICD-10-CM

## 2023-07-06 DIAGNOSIS — Z91.81 RISK FOR FALLS: ICD-10-CM

## 2023-07-06 DIAGNOSIS — K57.30 DIVERTICULOSIS LARGE INTESTINE W/O PERFORATION OR ABSCESS W/O BLEEDING: ICD-10-CM

## 2023-07-06 DIAGNOSIS — I10 ESSENTIAL HYPERTENSION: ICD-10-CM

## 2023-07-06 DIAGNOSIS — E55.9 VITAMIN D DEFICIENCY: ICD-10-CM

## 2023-07-06 DIAGNOSIS — K29.70 HELICOBACTER PYLORI GASTRITIS: ICD-10-CM

## 2023-07-06 DIAGNOSIS — N63.24 MASS OF LOWER INNER QUADRANT OF LEFT BREAST: ICD-10-CM

## 2023-07-06 DIAGNOSIS — B96.81 HELICOBACTER PYLORI GASTRITIS: ICD-10-CM

## 2023-07-06 DIAGNOSIS — Z12.11 COLON CANCER SCREENING: ICD-10-CM

## 2023-07-06 DIAGNOSIS — I25.10 CORONARY ARTERY DISEASE INVOLVING NATIVE CORONARY ARTERY OF NATIVE HEART WITHOUT ANGINA PECTORIS: ICD-10-CM

## 2023-07-06 DIAGNOSIS — Z00.00 MEDICARE ANNUAL WELLNESS VISIT, SUBSEQUENT: Primary | ICD-10-CM

## 2023-07-06 DIAGNOSIS — Z12.31 ENCOUNTER FOR SCREENING MAMMOGRAM FOR MALIGNANT NEOPLASM OF BREAST: ICD-10-CM

## 2023-07-06 DIAGNOSIS — D50.0 IRON DEFICIENCY ANEMIA DUE TO CHRONIC BLOOD LOSS: ICD-10-CM

## 2023-07-06 DIAGNOSIS — R73.9 HYPERGLYCEMIA: ICD-10-CM

## 2023-07-06 PROCEDURE — 99213 OFFICE O/P EST LOW 20 MIN: CPT | Performed by: FAMILY MEDICINE

## 2023-07-06 PROCEDURE — 1126F AMNT PAIN NOTED NONE PRSNT: CPT | Performed by: FAMILY MEDICINE

## 2023-07-06 PROCEDURE — G0439 PPPS, SUBSEQ VISIT: HCPCS | Performed by: FAMILY MEDICINE

## 2023-07-06 RX ORDER — ALENDRONATE SODIUM 70 MG/1
70 TABLET ORAL WEEKLY
Qty: 12 TABLET | Refills: 3 | Status: SHIPPED | OUTPATIENT
Start: 2023-07-06

## 2023-07-06 RX ORDER — CARVEDILOL 6.25 MG/1
6.25 TABLET ORAL 2 TIMES DAILY WITH MEALS
Qty: 60 TABLET | Refills: 0 | Status: SHIPPED | OUTPATIENT
Start: 2023-07-06

## 2023-07-06 RX ORDER — CLOPIDOGREL BISULFATE 75 MG/1
75 TABLET ORAL DAILY
Qty: 90 TABLET | Refills: 3 | Status: SHIPPED | OUTPATIENT
Start: 2023-07-06

## 2023-07-06 RX ORDER — ATORVASTATIN CALCIUM 40 MG/1
40 TABLET, FILM COATED ORAL DAILY
Qty: 90 TABLET | Refills: 3 | Status: SHIPPED | OUTPATIENT
Start: 2023-07-06

## 2023-07-06 RX ORDER — OLMESARTAN MEDOXOMIL 20 MG/1
20 TABLET ORAL DAILY
Qty: 90 TABLET | Refills: 1 | Status: SHIPPED | OUTPATIENT
Start: 2023-07-06

## 2023-07-06 RX ORDER — NIFEDIPINE 60 MG/1
60 TABLET, FILM COATED, EXTENDED RELEASE ORAL DAILY
Qty: 90 TABLET | Refills: 1 | Status: SHIPPED | OUTPATIENT
Start: 2023-07-06

## 2023-07-06 RX ORDER — FUROSEMIDE 20 MG/1
20 TABLET ORAL DAILY
Qty: 90 TABLET | Refills: 1 | Status: SHIPPED | OUTPATIENT
Start: 2023-07-06

## 2023-07-06 RX ORDER — MAGNESIUM OXIDE 400 MG (241.3 MG MAGNESIUM) TABLET
800 TABLET DAILY
Qty: 90 TABLET | Refills: 0 | Status: SHIPPED | OUTPATIENT
Start: 2023-07-06

## 2023-07-06 RX ORDER — PANTOPRAZOLE SODIUM 20 MG/1
20 TABLET, DELAYED RELEASE ORAL
Qty: 90 TABLET | Refills: 3 | Status: SHIPPED | OUTPATIENT
Start: 2023-07-06

## 2023-07-14 ENCOUNTER — EKG ENCOUNTER (OUTPATIENT)
Dept: LAB | Age: 78
End: 2023-07-14
Attending: FAMILY MEDICINE
Payer: MEDICARE

## 2023-07-14 ENCOUNTER — LAB ENCOUNTER (OUTPATIENT)
Dept: LAB | Age: 78
End: 2023-07-14
Attending: FAMILY MEDICINE
Payer: MEDICARE

## 2023-07-14 DIAGNOSIS — R73.9 HYPERGLYCEMIA: ICD-10-CM

## 2023-07-14 DIAGNOSIS — E55.9 VITAMIN D DEFICIENCY: ICD-10-CM

## 2023-07-14 DIAGNOSIS — I10 ESSENTIAL HYPERTENSION: ICD-10-CM

## 2023-07-14 DIAGNOSIS — I25.10 CORONARY ARTERY DISEASE INVOLVING NATIVE CORONARY ARTERY OF NATIVE HEART WITHOUT ANGINA PECTORIS: ICD-10-CM

## 2023-07-14 DIAGNOSIS — D50.0 IRON DEFICIENCY ANEMIA DUE TO CHRONIC BLOOD LOSS: ICD-10-CM

## 2023-07-14 LAB
ALBUMIN SERPL-MCNC: 3.9 G/DL (ref 3.4–5)
ALBUMIN/GLOB SERPL: 1.1 {RATIO} (ref 1–2)
ALP LIVER SERPL-CCNC: 79 U/L
ALT SERPL-CCNC: 20 U/L
ANION GAP SERPL CALC-SCNC: 4 MMOL/L (ref 0–18)
AST SERPL-CCNC: 20 U/L (ref 15–37)
ATRIAL RATE: 66 BPM
BASOPHILS # BLD AUTO: 0.03 X10(3) UL (ref 0–0.2)
BASOPHILS NFR BLD AUTO: 0.7 %
BILIRUB SERPL-MCNC: 0.6 MG/DL (ref 0.1–2)
BILIRUB UR QL STRIP.AUTO: NEGATIVE
BUN BLD-MCNC: 23 MG/DL (ref 7–18)
CALCIUM BLD-MCNC: 9.7 MG/DL (ref 8.5–10.1)
CHLORIDE SERPL-SCNC: 110 MMOL/L (ref 98–112)
CHOLEST SERPL-MCNC: 132 MG/DL (ref ?–200)
CO2 SERPL-SCNC: 27 MMOL/L (ref 21–32)
COLOR UR AUTO: YELLOW
CREAT BLD-MCNC: 0.94 MG/DL
EOSINOPHIL # BLD AUTO: 0.12 X10(3) UL (ref 0–0.7)
EOSINOPHIL NFR BLD AUTO: 2.9 %
ERYTHROCYTE [DISTWIDTH] IN BLOOD BY AUTOMATED COUNT: 12.2 %
EST. AVERAGE GLUCOSE BLD GHB EST-MCNC: 123 MG/DL (ref 68–126)
FASTING PATIENT LIPID ANSWER: YES
FASTING STATUS PATIENT QL REPORTED: YES
GFR SERPLBLD BASED ON 1.73 SQ M-ARVRAT: 62 ML/MIN/1.73M2 (ref 60–?)
GLOBULIN PLAS-MCNC: 3.6 G/DL (ref 2.8–4.4)
GLUCOSE BLD-MCNC: 120 MG/DL (ref 70–99)
GLUCOSE UR STRIP.AUTO-MCNC: NEGATIVE MG/DL
HBA1C MFR BLD: 5.9 % (ref ?–5.7)
HCT VFR BLD AUTO: 39 %
HDLC SERPL-MCNC: 71 MG/DL (ref 40–59)
HGB BLD-MCNC: 12.6 G/DL
IMM GRANULOCYTES # BLD AUTO: 0 X10(3) UL (ref 0–1)
IMM GRANULOCYTES NFR BLD: 0 %
KETONES UR STRIP.AUTO-MCNC: NEGATIVE MG/DL
LDLC SERPL CALC-MCNC: 49 MG/DL (ref ?–100)
LEUKOCYTE ESTERASE UR QL STRIP.AUTO: NEGATIVE
LYMPHOCYTES # BLD AUTO: 1.52 X10(3) UL (ref 1–4)
LYMPHOCYTES NFR BLD AUTO: 37 %
MCH RBC QN AUTO: 31.2 PG (ref 26–34)
MCHC RBC AUTO-ENTMCNC: 32.3 G/DL (ref 31–37)
MCV RBC AUTO: 96.5 FL
MONOCYTES # BLD AUTO: 0.34 X10(3) UL (ref 0.1–1)
MONOCYTES NFR BLD AUTO: 8.3 %
NEUTROPHILS # BLD AUTO: 2.1 X10 (3) UL (ref 1.5–7.7)
NEUTROPHILS # BLD AUTO: 2.1 X10(3) UL (ref 1.5–7.7)
NEUTROPHILS NFR BLD AUTO: 51.1 %
NITRITE UR QL STRIP.AUTO: NEGATIVE
NONHDLC SERPL-MCNC: 61 MG/DL (ref ?–130)
OSMOLALITY SERPL CALC.SUM OF ELEC: 297 MOSM/KG (ref 275–295)
P AXIS: 54 DEGREES
P-R INTERVAL: 170 MS
PH UR STRIP.AUTO: 7 [PH] (ref 5–8)
PLATELET # BLD AUTO: 234 10(3)UL (ref 150–450)
POTASSIUM SERPL-SCNC: 4.3 MMOL/L (ref 3.5–5.1)
PROT SERPL-MCNC: 7.5 G/DL (ref 6.4–8.2)
PROT UR STRIP.AUTO-MCNC: NEGATIVE MG/DL
Q-T INTERVAL: 476 MS
QRS DURATION: 162 MS
QTC CALCULATION (BEZET): 499 MS
R AXIS: 70 DEGREES
RBC # BLD AUTO: 4.04 X10(6)UL
RBC UR QL AUTO: NEGATIVE
SODIUM SERPL-SCNC: 141 MMOL/L (ref 136–145)
SP GR UR STRIP.AUTO: 1.01 (ref 1–1.03)
T AXIS: 64 DEGREES
TRIGL SERPL-MCNC: 56 MG/DL (ref 30–149)
TSI SER-ACNC: 0.78 MIU/ML (ref 0.36–3.74)
UROBILINOGEN UR STRIP.AUTO-MCNC: <2 MG/DL
VENTRICULAR RATE: 66 BPM
VIT D+METAB SERPL-MCNC: 38.1 NG/ML (ref 30–100)
VLDLC SERPL CALC-MCNC: 8 MG/DL (ref 0–30)
WBC # BLD AUTO: 4.1 X10(3) UL (ref 4–11)

## 2023-07-14 PROCEDURE — 84443 ASSAY THYROID STIM HORMONE: CPT

## 2023-07-14 PROCEDURE — 83036 HEMOGLOBIN GLYCOSYLATED A1C: CPT

## 2023-07-14 PROCEDURE — 80053 COMPREHEN METABOLIC PANEL: CPT

## 2023-07-14 PROCEDURE — 82306 VITAMIN D 25 HYDROXY: CPT

## 2023-07-14 PROCEDURE — 85025 COMPLETE CBC W/AUTO DIFF WBC: CPT

## 2023-07-14 PROCEDURE — 36415 COLL VENOUS BLD VENIPUNCTURE: CPT

## 2023-07-14 PROCEDURE — 80061 LIPID PANEL: CPT

## 2023-07-14 PROCEDURE — 93010 ELECTROCARDIOGRAM REPORT: CPT | Performed by: STUDENT IN AN ORGANIZED HEALTH CARE EDUCATION/TRAINING PROGRAM

## 2023-07-14 PROCEDURE — 81001 URINALYSIS AUTO W/SCOPE: CPT

## 2023-07-14 PROCEDURE — 93005 ELECTROCARDIOGRAM TRACING: CPT

## 2023-07-18 ENCOUNTER — HOSPITAL ENCOUNTER (OUTPATIENT)
Dept: MAMMOGRAPHY | Facility: HOSPITAL | Age: 78
Discharge: HOME | End: 2023-07-18
Attending: FAMILY MEDICINE
Payer: MEDICARE

## 2023-07-18 ENCOUNTER — HOSPITAL ENCOUNTER (OUTPATIENT)
Dept: ULTRASOUND IMAGING | Facility: HOSPITAL | Age: 78
Discharge: HOME | End: 2023-07-18
Attending: FAMILY MEDICINE
Payer: MEDICARE

## 2023-07-18 DIAGNOSIS — N63.24 MASS OF LOWER INNER QUADRANT OF LEFT BREAST: ICD-10-CM

## 2023-07-18 PROCEDURE — 76642 ULTRASOUND BREAST LIMITED: CPT | Performed by: FAMILY MEDICINE

## 2023-07-18 PROCEDURE — 77066 DX MAMMO INCL CAD BI: CPT | Performed by: FAMILY MEDICINE

## 2023-07-18 PROCEDURE — 77062 BREAST TOMOSYNTHESIS BI: CPT | Performed by: FAMILY MEDICINE

## 2023-07-20 ENCOUNTER — OFFICE VISIT (OUTPATIENT)
Dept: FAMILY MEDICINE CLINIC | Facility: CLINIC | Age: 78
End: 2023-07-20

## 2023-07-20 VITALS
WEIGHT: 126.63 LBS | SYSTOLIC BLOOD PRESSURE: 139 MMHG | DIASTOLIC BLOOD PRESSURE: 70 MMHG | BODY MASS INDEX: 25.53 KG/M2 | HEART RATE: 63 BPM | HEIGHT: 59 IN

## 2023-07-20 DIAGNOSIS — I10 ESSENTIAL HYPERTENSION: ICD-10-CM

## 2023-07-20 DIAGNOSIS — L03.90 CELLULITIS, UNSPECIFIED CELLULITIS SITE: ICD-10-CM

## 2023-07-20 DIAGNOSIS — I25.10 CORONARY ARTERY DISEASE INVOLVING NATIVE CORONARY ARTERY OF NATIVE HEART WITHOUT ANGINA PECTORIS: ICD-10-CM

## 2023-07-20 DIAGNOSIS — N60.02 BREAST CYST, LEFT: Primary | ICD-10-CM

## 2023-07-20 PROCEDURE — 99213 OFFICE O/P EST LOW 20 MIN: CPT | Performed by: FAMILY MEDICINE

## 2023-07-20 PROCEDURE — 1125F AMNT PAIN NOTED PAIN PRSNT: CPT | Performed by: FAMILY MEDICINE

## 2023-07-20 RX ORDER — AMOXICILLIN AND CLAVULANATE POTASSIUM 500; 125 MG/1; MG/1
1 TABLET, FILM COATED ORAL 2 TIMES DAILY
Qty: 20 TABLET | Refills: 0 | Status: SHIPPED | OUTPATIENT
Start: 2023-07-20 | End: 2023-07-30

## 2023-08-22 RX ORDER — CARVEDILOL 6.25 MG/1
6.25 TABLET ORAL 2 TIMES DAILY WITH MEALS
Qty: 180 TABLET | Refills: 1 | Status: SHIPPED | OUTPATIENT
Start: 2023-08-22

## 2023-08-22 NOTE — TELEPHONE ENCOUNTER
Refill passed per [employer] protocol.     Requested Prescriptions   Pending Prescriptions Disp Refills    CARVEDILOL 6.25 MG Oral Tab [Pharmacy Med Name: Carvedilol 6.25 MG Oral Tablet] 60 tablet 0     Sig: TAKE 1 TABLET BY MOUTH TWICE DAILY WITH MEALS       Hypertensive Medications Protocol Passed - 8/21/2023  9:33 AM        Passed - In person appointment in the past 12 or next 3 months     Recent Outpatient Visits              1 month ago Breast cyst, left    [facility], Dwight Tay, Divya Mason MD    Office Visit    1 month ago Medicare annual wellness visit, subsequent    [facility], [address], Divya Mason MD    Office Visit    1 year ago Medicare annual wellness visit, subsequent    [facility], [address], Divya Mason MD    Office Visit    1 year ago Essential hypertension    Divya Rhodes MD    Office Visit    1 year ago Parotid mass    [facility], [address], Fpwb-Lymhmz-ApdtdrmHusam MD    Office Visit                      Passed - Last BP reading less than 140/90     BP Readings from Last 1 Encounters:  07/20/23 : 139/70              Passed - CMP or BMP in past 6 months     Recent Results (from the past 4392 hour(s))   COMP METABOLIC PANEL (14)    Collection Time: 07/14/23  8:06 AM   Result Value Ref Range    Glucose 120 (H) 70 - 99 mg/dL    Sodium 141 136 - 145 mmol/L    Potassium 4.3 3.5 - 5.1 mmol/L    Chloride 110 98 - 112 mmol/L    CO2 27.0 21.0 - 32.0 mmol/L    Anion Gap 4 0 - 18 mmol/L    BUN 23 (H) 7 - 18 mg/dL    Creatinine 0.94 0.55 - 1.02 mg/dL    Calcium, Total 9.7 8.5 - 10.1 mg/dL    Calculated Osmolality 297 (H) 275 - 295 mOsm/kg    eGFR-Cr 62 >=60 mL/min/1.73m2    AST 20 15 - 37 U/L    ALT 20 13 - 56 U/L    Alkaline Phosphatase 79 55 - 142 U/L    Bilirubin, Total 0.6 0.1 - 2.0 mg/dL    Total Protein 7.5 6.4 - 8.2 g/dL    Albumin 3.9 3.4 - 5.0 g/dL    Globulin  3.6 2.8 - 4.4 g/dL    A/G Ratio 1.1 1.0 - 2.0    Patient Fasting for CMP? Yes      *Note: Due to a large number of results and/or encounters for the requested time period, some results have not been displayed. A complete set of results can be found in Results Review.                Passed - In person appointment or virtual visit in the past 6 months     Recent Outpatient Visits              1 month ago Breast cyst, left    [facility], Benny Clinton MD    Office Visit    1 month ago Medicare annual wellness visit, subsequent    Dwight Nathan, Benny Saavedra MD    Office Visit    1 year ago Medicare annual wellness visit, subsequent    Dwight Nathan, Benny Saavedra MD    Office Visit    1 year ago Essential hypertension    Benny Cespedes MD    Office Visit    1 year ago Parotid mass    Lauren Naegeli, [address], Hollie Sadler MD    Office Visit                      Passed - [facility] or GFRNAA > 50     GFR Evaluation  EGFRCR: 62 , resulted on 7/14/2023               Recent Outpatient Visits              1 month ago [facility], B[facility] Clinton [facility]    Office Visit    1 month ago Medicare annual wellness visit, subsequent    Benny Cespedes MD    Office Visit    1 year ago Medicare annual wellness visit, subsequent    Benny Cespedes MD    Office Visit    1 year ago Essential hypertension    Benny Cespedes MD    Office Visit    1 year ago Parotid mass    Lauren Naegeli, [address], Nico Burns MD    Office Visit

## 2023-08-22 NOTE — TELEPHONE ENCOUNTER
Refill passed per [facility] protocol. Requested Prescriptions   Pending Prescriptions Disp Refills    carvedilol 6.25 MG Oral Tab [Pharmacy Med Name: Carvedilol 6.25 MG Oral Tablet] 180 tablet 1     Sig: Take 1 tablet (6.25 mg total) by mouth 2 (two) times daily with meals.        Hypertensive Medications Protocol Passed - 8/21/2023  9:33 AM        Passed - In person appointment in the past 12 or next 3 months     Recent Outpatient Visits              1 month ago Breast cyst, left    [facility], Dwight Tay, Aby Holder MD    Office Visit    1 month ago Medicare annual wellness visit, subsequent    [facility], Dwight Tay, Aby Holder MD    Office Visit    1 year ago Medicare annual wellness visit, subsequent    [facility], Dwight Tay, Aby Holder MD    Office Visit    1 year ago Essential hypertension    [address] [facility], Aby Holder MD    Office Visit    1 year ago Parotid mass    [facility], [address] [facility], Matteo Salder MD    Office Visit                      Passed - Last BP reading less than 140/90     BP Readings from Last 1 Encounters:  07/20/23 : 139/70              Passed - CMP or BMP in past 6 months     Recent Results (from the past 4392 hour(s))   COMP METABOLIC PANEL (14)    Collection Time: 07/14/23  8:06 AM   Result Value Ref Range    Glucose 120 (H) 70 - 99 mg/dL    Sodium 141 136 - 145 mmol/L    Potassium 4.3 3.5 - 5.1 mmol/L    Chloride 110 98 - 112 mmol/L    CO2 27.0 21.0 - 32.0 mmol/L    Anion Gap 4 0 - 18 mmol/L    BUN 23 (H) 7 - 18 mg/dL    Creatinine 0.94 0.55 - 1.02 mg/dL    Calcium, Total 9.7 8.5 - 10.1 mg/dL    Calculated Osmolality 297 (H) 275 - 295 mOsm/kg    eGFR-Cr 62 >=60 mL/min/1.73m2    AST 20 15 - 37 U/L    ALT 20 13 - 56 U/L    Alkaline Phosphatase 79 55 - 142 U/L    Bilirubin, Total 0.6 0.1 - 2.0 mg/dL Total Protein 7.5 6.4 - 8.2 g/dL    Albumin 3.9 3.4 - 5.0 g/dL    Globulin  3.6 2.8 - 4.4 g/dL    A/G Ratio 1.1 1.0 - 2.0    Patient Fasting for CMP? Yes      *Note: Due to a large number of results and/or encounters for the requested time period, some results have not been displayed. A complete set of results can be found in Results Review.                Passed - In person appointment or virtual visit in the past 6 months     Recent Outpatient Visits              1 month ago Breast cyst, left    [facility], [address], Celi Erickson MD    Office Visit    1 month ago Medicare annual wellness visit, subsequent    [facility] Stein, Celi Erickson MD    Office Visit    1 year ago Medicare annual wellness visit, subsequent    [facility] Stein, Celi Erickson MD    Office Visit    1 year ago Essential hypertension    Celi Fuller MD    Office Visit    1 year ago Parotid mass    Quirino Jennifer, [address], Chalino Sadler MD    Office Visit                      Passed - [facility] or GFRNAA > 50     GFR Evaluation  EGFRCR: 62 , resulted on 7/14/2023               Recent Outpatient Visits              1 month ago Breast cyst, left    [facility], [address], Celi Erickson MD    Office Visit    1 month ago Medicare annual wellness visit, subsequent    Celi Fuller MD    Office Visit    1 year ago Medicare annual wellness visit, subsequent    Celi Fuller MD    Office Visit    1 year ago Essential hypertension    Celi Fuller MD    Office Visit    1 year ago Parotid mass    Quirino Jennifer, [address], [place], Magdalena Serra MD    Office Visit

## 2023-09-29 ENCOUNTER — MED REC SCAN ONLY (OUTPATIENT)
Dept: FAMILY MEDICINE CLINIC | Facility: CLINIC | Age: 78
End: 2023-09-29

## 2024-01-24 RX ORDER — OLMESARTAN MEDOXOMIL 20 MG/1
20 TABLET ORAL DAILY
Qty: 90 TABLET | Refills: 0 | Status: SHIPPED | OUTPATIENT
Start: 2024-01-24

## 2024-01-24 NOTE — TELEPHONE ENCOUNTER
Please review. Protocol failed / No Protocol.    Requested Prescriptions   Pending Prescriptions Disp Refills    NIFEDIPINE ER 60 MG Oral Tablet 24 Hr [Pharmacy Med Name: NIFEdipine ER 60 MG Oral Tablet Extended Release 24 Hour] 90 tablet 0     Sig: Take 1 tablet by mouth once daily       Hypertensive Medications Protocol Failed - 1/22/2024  9:09 AM        Failed - CMP or BMP in past 6 months     No results found for this or any previous visit (from the past 4392 hour(s)).            Failed - In person appointment or virtual visit in the past 6 months     Recent Outpatient Visits              6 months ago Breast cyst, left    [facility] [address]    Office Visit    6 months ago Medicare annual wellness visit, subsequent    [facility] [address] [place]    Office Visit    1 year ago Medicare annual wellness visit, subsequent    [facility]    [facility]    1 year ago Essential hypertension    [facility]    Office Visit    2 years ago Parotid mass    [facility], iGl Gallo MD    Office Visit                      Passed - In person appointment in the past 12 or next 3 months     Recent Outpatient Visits              6 months ago Breast cyst, left    [facility] [address], Lorenzo Cline MD    Office Visit    6 months ago Medicare annual wellness visit, subsequent    [facility]    Office Visit    1 year ago Medicare annual wellness visit, subsequent    [facility]    Office Visit    1 year ago Essential hypertension    [facility] [address], MD     Office Visit    2 years ago [facility], Gil Gallo MD    Office Visit                      Passed - Last BP reading less than 140/90     BP Readings from Last 1 Encounters:   07/20/23 139/70               Passed - EGFRCR or GFRNAA > 50     GFR Evaluation  EGFRCR: 62 , resulted on 7/14/2023            OLMESARTAN 20 MG Oral Tab [Pharmacy Med Name: Olmesartan Medoxomil 20 MG Oral Tablet] 90 tablet 0     Sig: Take 1 tablet by mouth once daily       Hypertensive Medications Protocol Failed - 1/22/2024  9:09 AM        Failed - CMP or BMP in past 6 months     No results found for this or any previous visit (from the past 4392 hour(s)).            Failed - In person appointment or virtual visit in the past 6 months     Recent Outpatient Visits              6 months ago Breast cyst, left    [facility], Lorenzo Cline MD    Office Visit    6 months ago Medicare annual wellness visit, subsequent    [facility]    [facility]    1 year ago Medicare annual wellness visit, subsequent    [facility]    [facility] Visit    1 year ago Essential hypertension    [facility]    [facility] Visit    2 years ago [facility], Gil Gallo MD    Office Visit                      Passed - In person appointment in the past 12 or next 3 months     Recent Outpatient Visits              6 months ago Breast cyst, left    [facility], Lorenzo Cline MD    Office Visit    6 months ago Medicare annual wellness visit, subsequent    [facility]    [facility] Visit    1 year ago Medicare annual wellness visit,  subsequent    [facility] [address], Lorenzo Cline MD    Office Visit    1 year ago Essential hypertension    [facility], [address], Lorenzo Cline MD    Office Visit    2 years ago Parotid mass    [facility], Gil Gallo MD    Office Visit                      Passed - Last BP reading less than 140/90     BP Readings from Last 1 Encounters:   07/20/23 139/70               Passed - EGFRCR or GFRNAA > 50     GFR Evaluation  EGFRCR: 62 , resulted on 7/14/2023                             [de-identified] : hives [FreeTextEntry6] : flare of Hives after ingesting Fruit Punch\par last seen 8 days ago Hydroxyzine controlled hives very well but ran out of med\par has appt for Allergist 04/05/19

## 2024-02-13 RX ORDER — CARVEDILOL 6.25 MG/1
6.25 TABLET ORAL 2 TIMES DAILY WITH MEALS
Qty: 180 TABLET | Refills: 3 | Status: SHIPPED | OUTPATIENT
Start: 2024-02-13

## 2024-02-13 NOTE — TELEPHONE ENCOUNTER
Refill passed per [facility] protocol.  Requested Prescriptions   Pending Prescriptions Disp Refills    CARVEDILOL 6.25 MG Oral Tab [Pharmacy Med Name: Carvedilol 6.25 MG Oral Tablet] 180 tablet 0     Sig: TAKE 1 TABLET BY MOUTH TWICE DAILY WITH MEALS       Hypertension Medications Protocol Passed - 2/12/2024  9:58 AM        Passed - CMP or BMP in past 12 months        Passed - Last BP reading less than 140/90     BP Readings from Last 1 Encounters:   07/20/23 139/70               Passed - In person appointment or virtual visit in the past 12 mos or appointment in next 3 mos     Recent Outpatient Visits              6 months ago Breast cyst, left    [facility], [address], [facility]    Office Visit    7 months ago Medicare annual wellness visit, subsequent    [facility] [place]urtis Ricardo, MD    Office Visit    1 year ago Medicare annual wellness visit, subsequent    [facility] [place]Curtis Ricardo, MD    Office Visit    1 year ago Essential hypertension    [facility] [place]urtis Ric[facility] MD    Office Visit    2 years ago [facility]otid mass    [facility]    Office Visit                      Passed - EGFRCR or GFRNAA > 50     GFR Evaluation  EGFRCR: 62 , resulted on 7/14/2023             Recent Outpatient Visits              6 months ago Breast cyst, left    [facility], [address], Lorenzo Cline MD    Office Visit    7 months ago Medicare annual wellness visit, subsequent    [facility] [place]Curtis Ricardo, MD    Office Visit    1 year ago Medicare annual wellness visit, subsequent    [facility] [place] Lorenzo Cline MD    Office Visit    1 year ago Essential hypertension    [facility], Lorenzo Cline MD    Office Visit    2 years ago [facility], Gil Gallo MD    Office Visit

## 2024-03-12 RX ORDER — FUROSEMIDE 20 MG/1
20 TABLET ORAL DAILY
Qty: 90 TABLET | Refills: 3 | Status: SHIPPED | OUTPATIENT
Start: 2024-03-12

## 2024-03-12 NOTE — TELEPHONE ENCOUNTER
Refill passed per [facility] protocol.    Requested Prescriptions   Pending Prescriptions Disp Refills    FUROSEMIDE 20 MG Oral Tab [Pharmacy Med Name: Furosemide 20 MG Oral Tablet] 90 tablet 0     Sig: Take 1 tablet by mouth once daily       Hypertension Medications Protocol Passed - 3/11/2024  1:18 PM        Passed - CMP or BMP in past 12 months        Passed - Last BP reading less than 140/90     BP Readings from Last 1 Encounters:   07/20/23 139/70               Passed - In person appointment or virtual visit in the past 12 mos or appointment in next 3 mos     Recent Outpatient Visits              7 months ago Breast cyst, left    [facility], Lorenzo Cline MD    Office Visit    8 months ago Medicare annual wellness visit, subsequent    [facility]    [facility] Visit    1 year ago Medicare annual wellness visit, subsequent    [facility]    Office Visit    1 year ago Essential hypertension    [facility], Lorenzo Cline MD    Office Visit    2 years ago Parotid mass    [facility] [place] Gil Carcamo MD    Office Visit                      Passed - EGFRCR or GFRNAA > 50     GFR Evaluation  EGFRCR: 62 , resulted on 7/14/2023               Recent Outpatient Visits              7 months ago Breast cyst, left    [facility], Lorenzo Cline MD    Office Visit    8 months ago Medicare annual wellness visit, subsequent    [facility], Lorenzo Cline MD    Office Visit    1 year ago Medicare annual wellness visit, subsequent    [facility]    Office Visit    1 year ago Essential hypertension    [facility]  [place], Lorenzo Cline MD    Office Visit    2 years ago Parotid [facility], [facility], Gil Gallo MD    Office Visit

## 2024-10-30 ENCOUNTER — MED REC SCAN ONLY (OUTPATIENT)
Dept: FAMILY MEDICINE CLINIC | Facility: CLINIC | Age: 79
End: 2024-10-30

## 2025-04-22 ENCOUNTER — TELEPHONE (OUTPATIENT)
Dept: FAMILY MEDICINE CLINIC | Facility: CLINIC | Age: 80
End: 2025-04-22

## 2025-04-22 NOTE — TELEPHONE ENCOUNTER
Called patient attempting to schedule Medicare Annual Physical, no answer. Unable to leave voicemail.

## 2025-06-06 ENCOUNTER — TELEPHONE (OUTPATIENT)
Dept: FAMILY MEDICINE CLINIC | Facility: CLINIC | Age: 80
End: 2025-06-06

## 2025-07-14 ENCOUNTER — TELEPHONE (OUTPATIENT)
Dept: FAMILY MEDICINE CLINIC | Facility: CLINIC | Age: 80
End: 2025-07-14

## 2025-07-14 NOTE — TELEPHONE ENCOUNTER
Spoke with roge from [facility]. Informed echo is from 10/22/2024 and it was done at [facility]. Roge stated it was ok as long as it was with in a year. Echo faxed to [phone]. Confirmation received

## 2025-07-14 NOTE — TELEPHONE ENCOUNTER
Anh from [facility] called to request echo test from November. Says patient has surgery tomorrow and needs it by today before 12     Fax: [phone]

## (undated) DEVICE — GOWN SURG AERO BLUE PERF LG

## (undated) DEVICE — SUTURE SILK 2-0 SA65H

## (undated) DEVICE — ENDOSCOPY PACK - LOWER: Brand: MEDLINE INDUSTRIES, INC.

## (undated) DEVICE — DRAIN RESERVOIR RELIAVAC 100CC

## (undated) DEVICE — SUCTION CANISTER, 3000CC,SAFELINER: Brand: DEROYAL

## (undated) DEVICE — Device: Brand: DEFENDO AIR/WATER/SUCTION AND BIOPSY VALVE

## (undated) DEVICE — NECK ACCESSORY: Brand: MEDLINE INDUSTRIES, INC.

## (undated) DEVICE — SOL  .9 1000ML BTL

## (undated) DEVICE — CONMED SCOPE SAVER BITE BLOCK, 20X27 MM: Brand: SCOPE SAVER

## (undated) DEVICE — SUTURE SILK 2-0 FS

## (undated) DEVICE — LINE MNTR ADLT SET O2 INTMD

## (undated) DEVICE — 1010 S-DRAPE TOWEL DRAPE 10/BX: Brand: STERI-DRAPE™

## (undated) DEVICE — SUTURE CHROMIC GUT 4-0 P-3

## (undated) DEVICE — SUTURE CHROMIC GUT 3-0 SH

## (undated) DEVICE — SUTURE ETHILON 4-0 1667G

## (undated) DEVICE — DRAIN INCS 7MM 20CMX7MM SIL

## (undated) DEVICE — ENCORE® LATEX ACCLAIM SIZE 8, STERILE LATEX POWDER-FREE SURGICAL GLOVE: Brand: ENCORE

## (undated) DEVICE — SUTURE ETHILON 5-0 661G

## (undated) DEVICE — PROBE 8225101 5PK STD PRASS FL TIP ROHS

## (undated) DEVICE — STERILE LATEX POWDER-FREE SURGICAL GLOVESWITH NITRILE COATING: Brand: PROTEXIS

## (undated) DEVICE — ENDOSCOPY PACK UPPER: Brand: MEDLINE INDUSTRIES, INC.

## (undated) DEVICE — FORCEP RADIAL JAW 4

## (undated) DEVICE — HEAD & NECK: Brand: MEDLINE INDUSTRIES, INC.

## (undated) DEVICE — CHLORAPREP 26ML APPLICATOR

## (undated) DEVICE — HEMOSTATIX THERMAL SCALPEL SYSTEM MODEL 5715 BLADE - QTY 24: Brand: HEMOSTATIX THERMAL SCALPEL SYSTEM MODEL 5715 BLADE - QTY 24

## (undated) DEVICE — DRESSING CRTY CNFRM 3IN

## (undated) DEVICE — 11.1-MULTIMODALITY IOM KIT

## (undated) NOTE — LETTER
04/19/21        [address] [place] [address] A  [facility] [id number]      Dear Aisha Santiago,    [address] records indicate that you have outstanding lab work and or testing that was ordered for you and has not yet been completed:  Orders Placed This

## (undated) NOTE — LETTER
Elsisa Alvarez [address], [facility], [place]  [address]  INFORMED CONSENT FOR TRANSFUSION OF BLOOD OR BLOOD PRODUCTS  My physician has informed me of the nature, purpose, benefits and risks of transfusion for blood and blood components that ______________________________________________  (Signature of Patient)                                                            (Responsible party in case of Minor,

## (undated) NOTE — LETTER
02/19/21        [address] [place] [address] A  [place] [address]      Dear Cathleen Sterling records indicate that you have outstanding lab work and or testing that was ordered for you and has not yet been completed:  Orders Placed This

## (undated) NOTE — LETTER
[address], [place]  Authorization for Invasive Procedures  1.  I hereby authorize Dr. Clementine Mckeon , my physician and whomever may be designated as the doctor's assistant, to perform the following operation and/or procedure:  CARDIAC CAT 5. I consent to the photographing of the operations or procedures to be performed for the purposes of advancing medicine, science, and/or education, provided my identity is not revealed.  If the procedure has been videotaped, the physician/surgeon will obta __________ Time: ___________    Statement of Physician  My signature below affirms that prior to the time of the procedure, I have explained to the patient and/or her legal representative, the risks and benefits involved in the proposed treatment and any r

## (undated) NOTE — LETTER
June 21, 2021     Yarely Black  [facility] [address]  [facility] [id number]      Dear Chato Bell:    Below are the results from your recent visit:    Resulted Orders   VITAMIN D, 25-HYDROXY   Result Value Ref Range    Vitamin D, 25OH, Total 2

## (undated) NOTE — LETTER
Jose Silva, [address] [address]       07/20/21        Patient: Jose Silva   YOB: 1945   Date of Visit: 7/20/2021       Dear  Dr. Maicol Reece MD,      Thank you for referring Jose Silva to my

## (undated) NOTE — LETTER
[facility], [place]  Authorization for Invasive Procedures  1.  I hereby authorize Dr. Marquis Escoto , my physician and whomever may be designated as the doctor's assistant, to perform the following operation and/or procedure:  Colonoscopy science, and/or education, provided my identity is not revealed. If the procedure has been videotaped, the physician/surgeon will obtain the original videotape. The hospital will not be responsible for storage or maintenance of this tape.      6. I consent explained to the patient and/or her legal representative, the risks and benefits involved in the proposed treatment and any reasonable alternative to the proposed treatment.  I have also explained the risks and benefits involved in the refusal of the propos

## (undated) NOTE — LETTER
[address], [place]  Authorization for Invasive Procedures  1.  I hereby authorize Dr. Delfina Kellogg , my physician and whomever may be designated as the doctor's assistant, to perform the following operation and/or procedure: ESOPHAGOGAST advancing medicine, science, and/or education, provided my identity is not revealed. If the procedure has been videotaped, the physician/surgeon will obtain the original videotape.  The hospital will not be responsible for storage or maintenance of this tap the procedure, I have explained to the patient and/or her legal representative, the risks and benefits involved in the proposed treatment and any reasonable alternative to the proposed treatment.  I have also explained the risks and benefits involved in the